# Patient Record
Sex: MALE | Race: WHITE | NOT HISPANIC OR LATINO | Employment: FULL TIME | URBAN - METROPOLITAN AREA
[De-identification: names, ages, dates, MRNs, and addresses within clinical notes are randomized per-mention and may not be internally consistent; named-entity substitution may affect disease eponyms.]

---

## 2022-06-09 ENCOUNTER — TELEPHONE (OUTPATIENT)
Dept: PSYCHIATRY | Facility: CLINIC | Age: 32
End: 2022-06-09

## 2022-06-09 NOTE — TELEPHONE ENCOUNTER
Referred by:      Please advised interviewee that they need to answer all questions truthfully to allow for best care and any misrepresentations of information may affect their ability to be seen at this clinic   => Was this discussed? Behavorial Health Outpatient Intake History -      Presenting Problem (in patient's words):     Patient has a history of Anxiety, Autism, and possible ADHD        Are there any developmental disabilities? ? If yes, can they speak to you on the phone? If they are too limited to speak to you on phone, refer out      No    Are you taking any psychiatric medications?    => If yes, who prescribes? If yes, are they injectable medications? No    Does the patient have a language barrier or hearing impairment? No     Have you been treated at [facility] [place] by a therapist or a doctor in the past? If yes, who? No    Has the patient been hospitalized for mental health? If yes, how long ago was last hospitalization and where was it? No     Do you actively use alcohol or marijuana or illegal substances? If yes, what and how much - refer out to Drug and alcohol treatment if use is excessive or daily use of illegal substances     Alcohol- a few time a week mostly social          Do you have a community treatment team or ? No     Legal History-      Does the patient have any history of arrests, senior living/group home time, or DUIs? If Yes-  1  What types of charges? 2  When were they last incarcerated? 3  Are they currently on parole or probation? Minor Child-  N/a     Who has custody of the child? Is there a custody agreement? If there is a custody agreement remind parent that they must bring a copy to the first appt or they will not be seen        Intake Team, please check with provider before scheduling if flags come up such as:  - complex case  - legal history (other than DUI)  - communication barrier concerns are present  - if, in your judgment, this needs further review     ACCEPTED as a patient      yes      =>        Appointment Date: 7/8/22 2pm with Dr Muñoz     Referred Elsewhere? Name of Insurance Co: [employer] ID# [id number]  [id number] Phone #  If ins is primary or secondary: Primary  If patient is a minor, parents information such as Name, D  O B of guarantor

## 2022-07-08 ENCOUNTER — TELEPHONE (OUTPATIENT)
Dept: PSYCHIATRY | Facility: CLINIC | Age: 32
End: 2022-07-08

## 2022-07-08 ENCOUNTER — OFFICE VISIT (OUTPATIENT)
Dept: PSYCHIATRY | Facility: CLINIC | Age: 32
End: 2022-07-08
Payer: COMMERCIAL

## 2022-07-08 VITALS
SYSTOLIC BLOOD PRESSURE: 111 MMHG | HEART RATE: 68 BPM | HEIGHT: 72 IN | WEIGHT: 166.3 LBS | DIASTOLIC BLOOD PRESSURE: 60 MMHG | BODY MASS INDEX: 22.52 KG/M2

## 2022-07-08 DIAGNOSIS — F90.2 ADHD (ATTENTION DEFICIT HYPERACTIVITY DISORDER), COMBINED TYPE: Primary | ICD-10-CM

## 2022-07-08 DIAGNOSIS — F41.1 GENERALIZED ANXIETY DISORDER: ICD-10-CM

## 2022-07-08 PROCEDURE — 90792 PSYCH DIAG EVAL W/MED SRVCS: CPT | Performed by: STUDENT IN AN ORGANIZED HEALTH CARE EDUCATION/TRAINING PROGRAM

## 2022-07-08 RX ORDER — ATOMOXETINE 40 MG/1
40 CAPSULE ORAL DAILY
Qty: 30 CAPSULE | Refills: 1 | Status: SHIPPED | OUTPATIENT
Start: 2022-07-08 | End: 2022-07-19 | Stop reason: SDUPTHER

## 2022-07-08 NOTE — PSYCH
[address]    Name and Date of Birth:  Nahomy Guzman 36 y o  1990 MRN: [id number]    Date of Visit: July 8, 2022    Reason for visit: Initial psychiatric intake assessment    Chief complaint:  I feel again been having trouble with attention focus  History of Present Illness (HPI):      Nahomy Guzman is a 36 y o , male, possessing a previous psychiatric history significant for ADHD, Asperger's, anxiety, presenting to the [address] [place] [address] outpatient clinic for intake assessment  Beverly Amaya states that he has carried past diagnosis of autism spectrum disorder, ADHD since a child  He is followed with a therapist in the more recent times, no major medications except for some antidepressants prescribed by his PCP  He states that over the past few months he has been experiencing worsening issues with attention and focus  He states that he is very distractible, has difficulty with most multi tasking, he is forgetful, distracted, impulsive, difficulty with tasks that require sustained mental effort, disorganization, often blurting things out, feeling restless, hypertalkative  He will sometimes blurt things out and finds he has difficulty interacting socially  This plays into some of his so describe symptoms of Asperger's, reporting that he has problems connecting with people and missing social cues  He does not have any major restricted interests or patterns, although does enjoy playing video games  Reports that he does have some increased anxiety symptoms include excessive worry and rumination, often related about money and being on places at a specific time  He does feel stressed, tense, irritable throughout the day  He has experienced intermittent panic attacks although none in the recent time  Reports that he does tend to over think situations  He describes that sleep has been fairly well controlled    Some minor decrease in interest levels, no guilt, no change in energy, decrease in concentration as mentioned above, no significant weight loss  He does report feeling depressed - "at times" - but does not feel this is a particular problem now  Denies any SI, HI, AVH  Denies any paranoia or obsessions  No bingeing or purging, no eating disorder  No yamilex or hypomania  Presently, patient denies suicidal/homicidal ideation in addition to thoughts of self-injury; contracts for safety, see below for risk assessment  At conclusion of evaluation, patient is amenable to the recommendations of this writer including: therapy, medications  Also, patient is amenable to calling/contacting the outpatient office including this writer if any acute adverse effects of their medication regimen arise in addition to any comments or concerns pertaining to their psychiatric management  Patient is amenable to calling/contacting crisis and/or attending to the nearest emergency department if their clinical condition deteriorates to assure their safety and stability, stating that they are able to appropriately confide in their family regarding their psychiatric state  Current Rating Scores:     None completed today      Psychiatric Review Of Systems:    Appetite: no  Adverse eating: no  Weight changes: no  Insomnia/sleeplessness: no  Fatigue/anergy: no  Anhedonia/lack of interest: decreased  Attention/concentration: decreased  Psychomotor agitation/retardation: no  Somatic symptoms: no  Anxiety/panic attack: yes  Yamilex/hypomania: no  Hopelessness/helplessness/worthlessness: no  Self-injurious behavior/high-risk behavior: no  Suicidal ideation: no  Homicidal ideation: no  Auditory hallucinations: no  Visual hallucinations: no  Other perceptual disturbances: no  Delusional thinking: no  Obsessive/compulsive symptoms: no    Review Of Systems:    Constitutional negative   ENT negative   Cardiovascular negative   Respiratory negative Gastrointestinal negative   Genitourinary negative   Musculoskeletal negative   Integumentary negative   Neurological negative   Endocrine negative   Other Symptoms none, all other systems are negative       Family Psychiatric History:     Father and mother with depression  Father with alcohol use although unsure if alcohol abuse  Past Psychiatric History:     Previous inpatient psychiatric admissions:  None  Previous inpatient/outpatient substance abuse rehabilitation:  None  Present/previous outpatient psychiatric treatment:  Had seen a psychiatrist during his childhood  Past diagnosis of ADHD, Asperger  Present/previous psychotherapy:  Saw therapist approximately 1 year ago  History of suicidal attempts/gestures:  None  History of violence/aggressive behaviors:  None  Present/previous psychotropic medication use:  Concerta, Lexapro, Celexa  Substance Abuse History:    Patient denies history of alcohol, illict substance, or tobacco abuse  Reports that he will use alcohol socially  Jordi Chan does not apear under the influence or withdrawal of any psychoactive substance throughout today's examination  Social History:    Academic history: college graduate; film studies from [employer]  Marital history: single  Social support system: lives alone  Residential history: Resides in [facility]; lives alone - previously in relationship with girlfriend but broke up few weeks back  Vocational History: works in [employer] field - [employer]  Legal History: none    Traumatic History:     Abuse:none is reported  Other Traumatic Events: n/a    Past Medical History:    No past medical history on file  No past surgical history on file  Not on File    History Review:     The following portions of the patient's history were reviewed and updated as appropriate: allergies, current medications, past family history, past medical history, past social history, past surgical history and problem list     OBJECTIVE:    Vital signs in last 24 hours:    Vitals:    07/08/22 1356   BP: 111/60   Pulse: 68   Weight: 75 4 kg (166 lb 4 8 oz)   Height: 6' (1 829 m)       Mental Status Evaluation:    Appearance age appropriate, casually dressed   Behavior cooperative, calm, limited eye contact   Speech normal rate, normal volume, normal pitch   Mood anxious   Affect constricted   Thought Processes organized, goal directed   Associations intact associations   Thought Content no overt delusions   Perceptual Disturbances: no auditory hallucinations, no visual hallucinations   Abnormal Thoughts  Risk Potential Suicidal ideation - None  Homicidal ideation - None  Potential for aggression - No   Orientation oriented to person, place, time/date and situation   Memory recent and remote memory grossly intact   Consciousness alert and awake   Attention Span Concentration Span attention span and concentration are age appropriate   Intellect appears to be of average intelligence   Insight intact   Judgement intact   Muscle Strength and  Gait normal muscle strength and normal muscle tone, normal gait and normal balance   Motor Activity no abnormal movements   Language no difficulty naming common objects, no difficulty repeating a phrase, no difficulty writing a sentence   Fund of Knowledge adequate knowledge of current events  adequate fund of knowledge regarding past history  adequate fund of knowledge regarding vocabulary    Pain none   Pain Scale 0       Laboratory Results: I have personally reviewed all pertinent laboratory/tests results    Recent Labs (last 2 months):   No visits with results within 2 Month(s) from this visit  Latest known visit with results is:   No results found for any previous visit         Suicide/Homicide Risk Assessment:    Risk of Harm to Self:  The following ratings are based on assessment at the time of the interview  Demographic risk factors include:   Historical Risk Factors include: chronic psychiatric problems  Recent Specific Risk Factors include: diagnosis of depression, current anxiety symptoms  Protective Factors: no current suicidal ideation, ability to adapt to change, able to manage anger well, access to mental health treatment, compliant with mental health treatment, connection to community, contact with caregivers, effective coping skills, effective decision-making skills, effective problem solving skills  Based on today's assessment, Padriac presents the following risk of harm to self: minimal    Risk of Harm to Others: The following ratings are based on assessment at the time of the interview  Demographic Risk Factors include: male  Historical Risk Factors include: none  Recent Specific Risk Factors include: multiple stressors  Protective Factors: no current homicidal ideation, ability to adapt to change, able to manage anger well, access to mental health treatment, compliant with mental health treatment, connection to community, effective coping skills, effective decision-making skills, effective problem solving skills  Based on today's assessment, Padriac presents the following risk of harm to others: minimal    The following interventions are recommended: no intervention changes needed  Although patient's acute lethality risk is low, long-term/chronic lethality risk is mildly elevated in the presence of PAVAN, ADHD  At the current moment, Yumiko Palmer is future-oriented, forward-thinking, and demonstrates ability to act in a self-preserving manner as evidenced by volitionally presenting to the clinic today, seeking treatment  Additionally, Yumiko Palmer sits throughout the assessment wearing personal protective gear (i e  medical mask) in the context of the ongoing COVID-19 pandemic, suggesting a will and desire to live  At this juncture, inpatient hospitalization is not currently warranted   To mitigate future risk, patient should adhere to the recommendations of this writer, avoid alcohol/illicit substance use, utilize community-based resources and familiar support and prioritize mental health treatment  Assessment/Plan:     Courtney Etienne is a 44-year-old male who carries a past psychiatric history significant for autism spectrum disorder ADHD that presents for establishment of care today  Reports worsening anxiety symptoms, excessive worry and rumination, mostly about money and being at places at a specific time  Feeling tense, irritable, stressed  Rare panic attacks  Also symptoms of ADHD with decreased concentration and feeling more distracted and forgetful  There is some hyperactive components as well as inattentive components  Does describe history of autism spectrum disorder, previously diagnosed with Asperger's  States that he has mostly difficulty with forming social connections although does not seem to have many other components of ASD which make it likely that if he is on the spectrum he is very high functioning  Has good insight, supports, good strengths  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[employer], 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  DSM-5 Diagnoses:      Generalized anxiety disorder, attention deficit hyperactivity disorder      Treatment Recommendations/Precautions:     Start Strattera 40 mg daily for attention, focus as well as depressive and anxiety symptoms   Medication management every 8 weeks   Referral for individual psychotherapy   Aware of 24 hour and weekend coverage for urgent situations accessed by calling [facility] main practice number    Medications Risks/Benefits:      Risks, Benefits And Possible Side Effects Of Medications:    Risks, benefits, and possible side effects of medications explained to GAVIOTA Barbour including risk of suicidality and serotonin syndrome related to treatment with antidepressants  He verbalizes understanding and agreement for treatment      Controlled Medication Discussion:     Not applicable    Treatment Plan:    Completed and signed during the session: Yes - with Ismael Conner    This note was not shared with the patient due to reasonable likelihood of causing patient harm    Tae Santos MD 07/08/22

## 2022-07-08 NOTE — BH TREATMENT PLAN
TREATMENT PLAN (Medication Management Only)        [facility]    Name and Date of Birth:  Madison Zafar 36 y o  1990  Date of Treatment Plan: July 8, 2022  Diagnosis/Diagnoses:    1  ADHD (attention deficit hyperactivity disorder), combined type    2  Generalized anxiety disorder      Strengths/Personal Resources for Self-Care: supportive family, supportive friends, taking medications as prescribed, ability to adapt to life changes, ability to communicate needs, ability to communicate well, ability to listen, ability to reason, ability to understand psychiatric illness  Area/Areas of need (in own words): anxiety, ADHD symptoms  1  Long Term Goal: continue improvement in ADHD symptoms  Target Date:6 months - 1/8/2023  Person/Persons responsible for completion of goal: Padriac  2  Short Term Objective (s) - How will we reach this goal?:   A  Provider new recommended medication/dosage changes and/or continue medication(s): continue current medications as prescribed Strattera  B  Attend medication management appointments regularly  Brayan Cervantes all scheduled appointments  Target Date:6 months - 1/8/2023  Person/Persons Responsible for Completion of Goal: Padriac  Progress Towards Goals: progressing  Treatment Modality: medication management every 2 months  Review due 180 days from date of this plan: 6 months - 1/8/2023  Expected length of service: maintenance  My Physician/PA/NP and I have developed this plan together and I agree to work on the goals and objectives  I understand the treatment goals that were developed for my treatment

## 2022-07-18 ENCOUNTER — TELEPHONE (OUTPATIENT)
Dept: PSYCHIATRY | Facility: CLINIC | Age: 32
End: 2022-07-18

## 2022-07-18 NOTE — TELEPHONE ENCOUNTER
Called Alber and was informed that Atomoxetine is covered under [employer] but there is a $91 13 co-pay  LM on Jennifer's VM that Dr Renny Boyd is not in today and I will refer to him for review tomorrow

## 2022-07-18 NOTE — TELEPHONE ENCOUNTER
Jennifer left a message at [facility] over the weekend  Patient states that his Catalina Constantino is too expensive  Insurance is covering it but it is still going to cost the patient $100 to fill it

## 2022-07-19 DIAGNOSIS — F90.2 ADHD (ATTENTION DEFICIT HYPERACTIVITY DISORDER), COMBINED TYPE: ICD-10-CM

## 2022-07-19 RX ORDER — ATOMOXETINE 40 MG/1
40 CAPSULE ORAL DAILY
Qty: 30 CAPSULE | Refills: 1 | Status: SHIPPED | OUTPATIENT
Start: 2022-07-19 | End: 2022-10-21

## 2022-08-26 ENCOUNTER — OFFICE VISIT (OUTPATIENT)
Dept: PSYCHIATRY | Facility: CLINIC | Age: 32
End: 2022-08-26
Payer: COMMERCIAL

## 2022-08-26 DIAGNOSIS — F90.2 ADHD (ATTENTION DEFICIT HYPERACTIVITY DISORDER), COMBINED TYPE: Primary | ICD-10-CM

## 2022-08-26 PROCEDURE — 99214 OFFICE O/P EST MOD 30 MIN: CPT | Performed by: STUDENT IN AN ORGANIZED HEALTH CARE EDUCATION/TRAINING PROGRAM

## 2022-08-26 PROCEDURE — 90833 PSYTX W PT W E/M 30 MIN: CPT | Performed by: STUDENT IN AN ORGANIZED HEALTH CARE EDUCATION/TRAINING PROGRAM

## 2022-08-26 RX ORDER — DEXTROAMPHETAMINE SACCHARATE, AMPHETAMINE ASPARTATE, DEXTROAMPHETAMINE SULFATE AND AMPHETAMINE SULFATE 1.25; 1.25; 1.25; 1.25 MG/1; MG/1; MG/1; MG/1
5 TABLET ORAL 2 TIMES DAILY
Qty: 60 TABLET | Refills: 0 | Status: SHIPPED | OUTPATIENT
Start: 2022-08-26 | End: 2022-10-21

## 2022-08-26 NOTE — PSYCH
MEDICATION MANAGEMENT NOTE        [address]      Name and Date of Birth:  Giovanni Gentile 36 y o  1990 MRN: [id number]    Date of Visit: August 26, 2022    Reason for Visit: Follow-up visit regarding medication management     Chief Complaint: "The last medicine did not work"    SUBJECTIVE:    Giovanni Gentile is a 36 y o , male, possessing a past psychiatric history significant for ADHD, Asperger's, anxiety, who was personally seen and evaluated at the [address] [place] [address] E outpatient clinic for follow-up regarding medication management  Melissamassiel Cannon states that since their previous outpatient psychiatric appointment, he continues to experience difficulty with attention and focus  He tried the Strattera for approximately 1 week, began to experience side effects and discontinued it  Stated that he felt zoned out while taking it and overall felt as though it worsened his anxiety so he decided to stop it  Reports that he continues to have trouble with focus and attention, easily is distracted, finds that he is forgetful with tasks and has to set reminders  He finds that he can be hyper focused on some things for period of time though this is brief  He finds that due to the poor focus his anxiety levels are increased  Finds that he worries about things, often completing duties and forgetting meetings at work  Reports that otherwise sleep is stable, appetite is good  Energy levels are adequate  No SI, HI, AVH  Current Rating Scores:   None completed today      Psychiatric Review Of Systems:    Appetite: no  Adverse eating: no  Weight changes: no  Insomnia/sleeplessness: no  Fatigue/anergy: no  Anhedonia/lack of interest: no  Attention/concentration: decreased  Psychomotor agitation/retardation: no  Somatic symptoms: no  Anxiety/panic attack: yes, worrying  Yamilex/hypomania: no  Hopelessness/helplessness/worthlessness: no  Self-injurious behavior/high-risk behavior: no  Suicidal ideation: no  Homicidal ideation: no  Auditory hallucinations: no  Visual hallucinations: no  Other perceptual disturbances: no  Delusional thinking: no  Obsessive/compulsive symptoms: no    Review Of Systems:      Constitutional negative   ENT negative   Cardiovascular negative   Respiratory negative   Gastrointestinal negative   Genitourinary negative   Musculoskeletal negative   Integumentary negative   Neurological negative   Endocrine negative   Other Symptoms none, all other systems are negative     History Review:    The following portions of the patient's history were reviewed and updated as appropriate: allergies, current medications, past family history, past medical history, past social history, past surgical history and problem list          OBJECTIVE:     Vital signs in last 24 hours:    Vitals:       Mental Status Evaluation:    Appearance age appropriate, casually dressed   Behavior cooperative, mildly anxious, limited eye contact   Speech normal rate, normal volume, normal pitch   Mood anxious   Affect constricted   Thought Processes organized, goal directed   Associations intact associations   Thought Content no overt delusions   Perceptual Disturbances: no auditory hallucinations, no visual hallucinations   Abnormal Thoughts  Risk Potential Suicidal ideation - None  Homicidal ideation - None  Potential for aggression - No   Orientation oriented to person, place, time/date and situation   Memory recent and remote memory grossly intact   Consciousness alert and awake   Attention Span Concentration Span decreased attention span  decreased concentration   Intellect appears to be of average intelligence   Insight intact   Judgement intact   Muscle Strength and  Gait normal muscle strength and normal muscle tone, normal gait and normal balance   Motor activity no abnormal movements   Fund of Knowledge adequate knowledge of current events  adequate fund of knowledge regarding past history  adequate fund of knowledge regarding vocabulary    Pain none   Pain Scale 0       Laboratory Results: I have personally reviewed all pertinent laboratory/tests results    Recent Labs (last 2 months):   No visits with results within 2 Month(s) from this visit  Latest known visit with results is:   No results found for any previous visit  Suicide/Homicide Risk Assessment:    The following interventions are recommended: no intervention changes needed  Although patient's acute lethality risk is low, long-term/chronic lethality risk is mildly elevated in the presence of ADHD, PAVAN  At the current moment, Jorgito Pittman is future-oriented, forward-thinking, and demonstrates ability to act in a self-preserving manner as evidenced by volitionally presenting to the clinic today, seeking treatment  To mitigate future risk, patient should adhere to the recommendations below, avoid alcohol/illicit substance use, utilize community-based resources and familiar support and prioritize mental health treatment  Presently, patient denies active suicidal/homicidal ideation in addition to thoughts of self-injury; contracts for safety  At conclusion of evaluation, patient is amenable to the recommendations below  Patient is amenable to calling/contacting the outpatient office including this writer if any acute adverse effects of their medication regimen arise in addition to any comments or concerns pertaining to their psychiatric management  Patient is amenable to calling/contacting crisis and/or attending to the nearest emergency department if their clinical condition deteriorates to assure their safety and stability, stating that they are able to appropriately confide in their family regarding their psychiatric state      Assessment/Plan:     Ericka Rojas is a 24-year-old male who carries a past psychiatric history significant for autism spectrum disorder ADHD that presents for establishment of care today  Reports worsening anxiety symptoms, excessive worry and rumination, mostly about money and being at places at a specific time  Feeling tense, irritable, stressed  Rare panic attacks  Also symptoms of ADHD with decreased concentration and feeling more distracted and forgetful  There is some hyperactive components as well as inattentive components  Does describe history of autism spectrum disorder, previously diagnosed with Asperger's  States that he has mostly difficulty with forming social connections although does not seem to have many other components of ASD which make it likely that if he is on the spectrum he is very high functioning  Has good insight, supports, good strengths  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[employer], 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  8/26/22 - Did not tolerate Strattera  Still struggling with attention and focus  Anxiety with work, though mostly related to the poor focus and attention at work  I feel the inadequate control of ADHD is contributing to his anxiety  Will trial Adderall 5mg BID and titrate to improve focus  Will monitor anxiety to see if this is aggravated  Medication trials: Strattera - "zoned out"    DSM-5 Diagnoses:      Generalized anxiety disorder, attention deficit hyperactivity disorder inattentive type      Treatment Recommendations/Precautions:     Start Adderall 5 mg b i d  for attention and focus     Medication management every 8 weeks   Referral for individual psychotherapy   Aware of 24 hour and weekend coverage for urgent situations accessed by calling [facility] main practice number    Medications Risks/Benefits      Risks, Benefits And Possible Side Effects Of Medications:    Risks, benefits, and possible side effects of medications explained to GAVIOTA Barbour including risks of cardiovascular side effects including elevated blood pressure, risk of misuse, abuse or dependence and risk of increased anxiety related to treatment with stimulant medications  He verbalizes understanding and agreement for treatment  Controlled Medication Discussion:     Palomo Salguero has been filling controlled prescriptions on time as prescribed according to [facility]  Discussed with Jennifer the risks of sedation, respiratory depression, impairment of ability to drive and potential for abuse and addiction related to treatment with benzodiazepine medications  He understands risk of treatment with benzodiazepine medications, agrees to not drive if feels impaired and agrees to take medications as prescribed  Discussed with Jennifer [employer] [place] warning on concurrent use of benzodiazepines and opioid medications including sedation, respiratory depression, coma and death  He understands the risk of treatment with benzodiazepines in addition to opioids and wants to continue taking those medications    Psychotherapy Provided:     Individual psychotherapy provided: Yes  Counseling was provided during the session today for 16 minutes  Medications, treatment progress and treatment plan reviewed with Jennifer  Medication changes discussed with Jennifer  Recent stressor including problems at work, recent medication change, social difficulties and everyday stressors discussed with Jennifer  Supportive therapy provided        Treatment Plan:    Completed and signed during the session: Not applicable - Treatment Plan not due at this session    This note was not shared with the patient due to reasonable likelihood of causing patient harm     Fabiana Grover MD 08/26/22

## 2022-10-18 ENCOUNTER — TELEPHONE (OUTPATIENT)
Dept: PSYCHIATRY | Facility: CLINIC | Age: 32
End: 2022-10-18

## 2022-10-21 ENCOUNTER — OFFICE VISIT (OUTPATIENT)
Dept: PSYCHIATRY | Facility: CLINIC | Age: 32
End: 2022-10-21
Payer: COMMERCIAL

## 2022-10-21 DIAGNOSIS — F33.1 MAJOR DEPRESSIVE DISORDER, RECURRENT, MODERATE (HCC): ICD-10-CM

## 2022-10-21 DIAGNOSIS — F90.2 ADHD (ATTENTION DEFICIT HYPERACTIVITY DISORDER), COMBINED TYPE: Primary | ICD-10-CM

## 2022-10-21 DIAGNOSIS — F41.1 GENERALIZED ANXIETY DISORDER: ICD-10-CM

## 2022-10-21 PROCEDURE — 99214 OFFICE O/P EST MOD 30 MIN: CPT | Performed by: STUDENT IN AN ORGANIZED HEALTH CARE EDUCATION/TRAINING PROGRAM

## 2022-10-21 PROCEDURE — 90833 PSYTX W PT W E/M 30 MIN: CPT | Performed by: STUDENT IN AN ORGANIZED HEALTH CARE EDUCATION/TRAINING PROGRAM

## 2022-10-21 RX ORDER — METHYLPHENIDATE HYDROCHLORIDE 10 MG/1
10 CAPSULE, EXTENDED RELEASE ORAL EVERY MORNING
Qty: 30 CAPSULE | Refills: 0 | Status: SHIPPED | OUTPATIENT
Start: 2022-10-21 | End: 2022-11-20

## 2022-10-21 RX ORDER — ESCITALOPRAM OXALATE 10 MG/1
10 TABLET ORAL DAILY
Qty: 30 TABLET | Refills: 1 | Status: SHIPPED | OUTPATIENT
Start: 2022-10-21 | End: 2022-11-20

## 2022-10-21 NOTE — PSYCH
MEDICATION MANAGEMENT NOTE        [facility]      Name and Date of Birth:  Sb Bruner 36 y o  1990 MRN: [id number]    Date of Visit: October 21, 2022    Reason for Visit: Follow-up visit regarding medication management     Chief Complaint: "My focus has been bad"    SUBJECTIVE:    Sb Bruner is a 36 y o , male, possessing a past psychiatric history significant for ADHD, Asperger's, anxiety, who was personally seen and evaluated at the [address] [place] [address] E outpatient clinic for follow-up regarding medication management  Annel Joya states that since their previous outpatient psychiatric appointment, his ADHD symptoms have worsened  He reports that he did not tolerate the Adderall well  He felt that it made him feel more “spacey”  He also reports that increased his anxiety which resulted in worsening attention and focus  He notes that inattention seems to be worse over the past few weeks as well as impulsivity  He reports that he has been more distracted and forgetful  Gives the example of forgetting to get his bad/uniform for work and having to return back home  He finds that he is more distractible and easily frustrated  He also notes being more impulsive and endorsing hyperactive symptoms  States he has been blurting things out, butting into conversations, which has resulted in some frustrations with friends in public  He also reports some worsening symptoms of depression  Notes that he is not enjoying things as much as he previously did, feeling low energy, decreased motivation levels  He has some guilt associated with this  Anxiety remains intermittent,  with ruminations about stressors throughout the day  He does endorse some passive and fleeting suicidal thoughts, never any intention that he would harm himself though wishing that “all this would go way”  Denies any HI, AVH      Current Rating Scores:   None completed today     Psychiatric Review Of Systems:    Appetite: no  Adverse eating: no  Weight changes: no  Insomnia/sleeplessness: decreased  Fatigue/anergy: decreased  Anhedonia/lack of interest: decreased  Attention/concentration: decreased  Psychomotor agitation/retardation: no  Somatic symptoms: no  Anxiety/panic attack: yes  Yamilex/hypomania: no  Hopelessness/helplessness/worthlessness: yes  Self-injurious behavior/high-risk behavior: no  Suicidal ideation: yes, passive death wish  Homicidal ideation: no  Auditory hallucinations: no  Visual hallucinations: no  Other perceptual disturbances: no  Delusional thinking: no  Obsessive/compulsive symptoms: no    Review Of Systems:      Constitutional feeling tired   ENT negative   Cardiovascular negative   Respiratory negative   Gastrointestinal negative   Genitourinary negative   Musculoskeletal negative   Integumentary negative   Neurological negative   Endocrine negative   Other Symptoms none, all other systems are negative     History Review: The following portions of the patient's history were reviewed and updated as appropriate: allergies, current medications, past family history, past medical history, past social history, past surgical history, and problem list          OBJECTIVE:     Vital signs in last 24 hours: There were no vitals filed for this visit      Mental Status Evaluation:    Appearance age appropriate, casually dressed   Behavior cooperative, calm, decreased eye contact   Speech normal rate, normal volume, normal pitch, hypertalkative   Mood depressed   Affect constricted   Thought Processes organized, goal directed   Associations intact associations   Thought Content no overt delusions   Perceptual Disturbances: no auditory hallucinations, no visual hallucinations   Abnormal Thoughts  Risk Potential Suicidal ideation - None  Homicidal ideation - None  Potential for aggression - No   Orientation oriented to person, place, time/date and situation Memory recent and remote memory grossly intact   Consciousness alert and awake   Attention Span Concentration Span attention span and concentration are age appropriate   Intellect appears to be of average intelligence   Insight intact   Judgement intact   Muscle Strength and  Gait normal muscle strength and normal muscle tone, normal gait and normal balance   Motor activity no abnormal movements   Fund of Knowledge adequate knowledge of current events  adequate fund of knowledge regarding past history  adequate fund of knowledge regarding vocabulary    Pain none   Pain Scale 0       Laboratory Results: I have personally reviewed all pertinent laboratory/tests results    Recent Labs (last 2 months):   No visits with results within 2 Month(s) from this visit  Latest known visit with results is:   No results found for any previous visit  Suicide/Homicide Risk Assessment:    The following interventions are recommended: no intervention changes needed  Although patient's acute lethality risk is low, long-term/chronic lethality risk is mildly elevated in the presence of MDD, ADHD, PAVAN  At the current moment, Jorgito Pittman is future-oriented, forward-thinking, and demonstrates ability to act in a self-preserving manner as evidenced by volitionally presenting to the clinic today, seeking treatment  To mitigate future risk, patient should adhere to the recommendations below, avoid alcohol/illicit substance use, utilize community-based resources and familiar support and prioritize mental health treatment  Presently, patient denies active suicidal/homicidal ideation in addition to thoughts of self-injury; contracts for safety  At conclusion of evaluation, patient is amenable to the recommendations below   Patient is amenable to calling/contacting the outpatient office including this writer if any acute adverse effects of their medication regimen arise in addition to any comments or concerns pertaining to their psychiatric management  Patient is amenable to calling/contacting crisis and/or attending to the nearest emergency department if their clinical condition deteriorates to assure their safety and stability, stating that they are able to appropriately confide in their family/friends regarding their psychiatric state  Assessment/Plan:     Freddie De Leon is a 63-year-old male who carries a past psychiatric history significant for autism spectrum disorder ADHD that presents for establishment of care today  Reports worsening anxiety symptoms, excessive worry and rumination, mostly about money and being at places at a specific time  Feeling tense, irritable, stressed  Rare panic attacks  Also symptoms of ADHD with decreased concentration and feeling more distracted and forgetful  There is some hyperactive components as well as inattentive components  Does describe history of autism spectrum disorder, previously diagnosed with Asperger's  States that he has mostly difficulty with forming social connections although does not seem to have many other components of ASD which make it likely that if he is on the spectrum he is very high functioning  Has good insight, supports, good strengths  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[employer], 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  8/26/22 - Did not tolerate Strattera  Still struggling with attention and focus  Anxiety with work, though mostly related to the poor focus and attention at work  I feel the inadequate control of ADHD is contributing to his anxiety  Will trial Adderall 5mg BID and titrate to improve focus  Will monitor anxiety to see if this is aggravated  10/26/22 - Increased anxiety with Adderall, he discontinued it  Struggling with inattentive as well as hyperactive symptoms  Increased depressive symptoms occurring as well  There is likely a combination of anxiety and uncontrolled ADHD will that is occurring or concurrently    He is having some work impairment with his inattention and poor focus  He would likely benefit from a long-acting stimulant as opposed to immediate release to medicate the anxiety side effects  We will also start Lexapro to target depression and anxiety symptoms, he tolerated this well in the past  He has trouble remembering to take medications - I reminded him of the [id number] adrien to assist with this  Medication trials: Strattera - "zoned out"; Adderall IR - increased anxiety    DSM-5 Diagnoses:     • Generalized anxiety disorder, attention deficit hyperactivity disorder inattentive type      Treatment Recommendations/Precautions:    • Start Ritalin LA 10mg daily for attention and focus  • Start Lexapro 10mg daily for depression  • Medication management every 4 weeks  • Referral for individual psychotherapy  • Aware of 24 hour and weekend coverage for urgent situations accessed by calling [facility] main practice number    Medications Risks/Benefits      Risks, Benefits And Possible Side Effects Of Medications:    Risks, benefits, and possible side effects of medications explained to GAVIOTA Barbour including risk of suicidality and serotonin syndrome related to treatment with antidepressants and risks of cardiovascular side effects including elevated blood pressure, risk of misuse, abuse or dependence and risk of increased anxiety related to treatment with stimulant medications  He verbalizes understanding and agreement for treatment  Controlled Medication Discussion:     GAVIOTA Mehran Angle has been filling controlled prescriptions on time as prescribed according to [address] Monitoring Program    Psychotherapy Provided:     Individual psychotherapy provided: Yes  Counseling was provided during the session today for 16 minutes  Medication changes discussed with Jennifer  Medication education provided to GAVIOTA Barbour    Recent stressors discussed with Jennifer including job stress, problems at work, social difficulties, everyday stressors and occasional anxiety  Reassurance and supportive therapy provided        Treatment Plan:    Completed and signed during the session: Not applicable - Treatment Plan not due at this session    This note was not shared with the patient due to reasonable likelihood of causing patient harm    Visit start and stop times:    Start Time: 3:00 PM  Stop Time: 3:32 PM    I spent 32 minutes directly with the patient during this visit        Karla Kemp MD 10/21/22

## 2022-11-18 ENCOUNTER — OFFICE VISIT (OUTPATIENT)
Dept: PSYCHIATRY | Facility: CLINIC | Age: 32
End: 2022-11-18

## 2022-11-18 DIAGNOSIS — F41.1 GENERALIZED ANXIETY DISORDER: ICD-10-CM

## 2022-11-18 DIAGNOSIS — F90.2 ADHD (ATTENTION DEFICIT HYPERACTIVITY DISORDER), COMBINED TYPE: ICD-10-CM

## 2022-11-18 DIAGNOSIS — F33.1 MAJOR DEPRESSIVE DISORDER, RECURRENT, MODERATE (HCC): Primary | ICD-10-CM

## 2022-11-18 RX ORDER — METHYLPHENIDATE HYDROCHLORIDE 18 MG/1
18 TABLET ORAL DAILY
Qty: 30 TABLET | Refills: 0 | Status: SHIPPED | OUTPATIENT
Start: 2022-11-18 | End: 2022-11-18

## 2022-11-18 RX ORDER — METHYLPHENIDATE HYDROCHLORIDE 10 MG/1
10 CAPSULE, EXTENDED RELEASE ORAL EVERY MORNING
Qty: 30 CAPSULE | Refills: 0 | Status: SHIPPED | OUTPATIENT
Start: 2022-11-18 | End: 2022-11-18

## 2022-11-18 RX ORDER — METHYLPHENIDATE HYDROCHLORIDE 5 MG/1
5 TABLET ORAL
Qty: 60 TABLET | Refills: 0 | Status: SHIPPED | OUTPATIENT
Start: 2022-11-18 | End: 2022-12-18

## 2022-11-18 RX ORDER — ESCITALOPRAM OXALATE 10 MG/1
10 TABLET ORAL DAILY
Qty: 30 TABLET | Refills: 2 | Status: SHIPPED | OUTPATIENT
Start: 2022-11-18 | End: 2022-12-18

## 2022-11-18 NOTE — PSYCH
MEDICATION MANAGEMENT NOTE        [address] [facility]      Name and Date of Birth:  Mouna Muniz 36 y o  1990 MRN: [id number]    Date of Visit: November 18, 2022    Reason for Visit: Follow-up visit regarding medication management     Chief Complaint: "The depression is a little better"    SUBJECTIVE:    Mouna Muniz is a 36 y o , male, possessing a past psychiatric history significant for ADHD, Asperger's, anxiety, who presents for follow-up appointment for medication management  Arjun Arita states that since their previous outpatient psychiatric appointment, he notes some improvement in depression  He reports that he is not feeling as down, negative, pessimistic  He feels that he is slightly happier, finding that he is able to enjoy things a slightly more  He continues to experience significant anxiety and hyperactive symptoms  He feels as though he is driven by a motor, describes feeling the need to “constantly do things"  He states when he is not active or preoccupied with the test he feels tense, restless, fidgety  He often feels he will worry about things and overthink situations - he recognizes this is likely irrational but cannot stop the ruminations  He reports ongoing issues with attention and concentration  Feeling distracted  He was unable to obtain long-acting formulation of Ritalin due to expense at pharmacy  He is interested in trialing the short-acting formulation even though did result in increased anxiety and was difficult to adhere to twice daily dosing  He reports that his sleep has been fairly well, averaging 6-7 hours per night  Appetite is stable, no significant changes in his weight  He denies any major side effects to the Lexapro, though does report that it made him feel “mentally tired”  Denies any SI, HI, AVH      Current Rating Scores:   Current PHQ-9   PHQ-2/9 Depression Screening           Psychiatric Review Of Systems:    Appetite: no  Adverse eating: no  Weight changes: no  Insomnia/sleeplessness: no  Fatigue/anergy: no  Anhedonia/lack of interest: slightly improved  Attention/concentration: decreased  Psychomotor agitation/retardation: no  Somatic symptoms: no  Anxiety/panic attack: yes  Yamilex/hypomania: no  Hopelessness/helplessness/worthlessness: no  Self-injurious behavior/high-risk behavior: no  Suicidal ideation: no  Homicidal ideation: no  Auditory hallucinations: no  Visual hallucinations: no  Other perceptual disturbances: no  Delusional thinking: no  Obsessive/compulsive symptoms: no    Review Of Systems:      Constitutional negative   ENT negative   Cardiovascular negative   Respiratory negative   Gastrointestinal negative   Genitourinary negative   Musculoskeletal negative   Integumentary negative   Neurological negative   Endocrine negative   Other Symptoms none, all other systems are negative     History Review: The following portions of the patient's history were reviewed and updated as appropriate: allergies, current medications, past family history, past medical history, past social history, past surgical history, and problem list     Past Medical History:    No past medical history on file       Not on File    Substance Abuse History:    Social History     Substance and Sexual Activity   Alcohol Use Not on file     Social History     Substance and Sexual Activity   Drug Use Not on file       Social History:    Social History     Socioeconomic History   • Marital status: Single     Spouse name: Not on file   • Number of children: Not on file   • Years of education: Not on file   • Highest education level: Not on file   Occupational History   • Not on file   Tobacco Use   • Smoking status: Not on file   • Smokeless tobacco: Not on file   Substance and Sexual Activity   • Alcohol use: Not on file   • Drug use: Not on file   • Sexual activity: Not on file   Other Topics Concern   • Not on file   Social History Narrative   • Not on file     Social Determinants of Health     Financial Resource Strain: Not on file   Food Insecurity: Not on file   Transportation Needs: Not on file   Physical Activity: Not on file   Stress: Not on file   Social Connections: Not on file   Intimate Partner Violence: Not on file   Housing Stability: Not on file       Family Psychiatric History:     No family history on file  OBJECTIVE:     Vital signs in last 24 hours: There were no vitals filed for this visit  Mental Status Evaluation:    Appearance age appropriate, casually dressed   Behavior cooperative, calm, limited eye contact   Speech normal rate, normal volume, normal pitch   Mood anxious   Affect normal range and intensity, appropriate   Thought Processes organized, goal directed   Associations intact associations   Thought Content no overt delusions   Perceptual Disturbances: no auditory hallucinations, no visual hallucinations   Abnormal Thoughts  Risk Potential Suicidal ideation - None  Homicidal ideation - None  Potential for aggression - No   Orientation oriented to person, place, time/date and situation   Memory recent and remote memory grossly intact   Consciousness alert and awake   Attention Span Concentration Span attention span and concentration are age appropriate   Intellect appears to be of average intelligence   Insight intact   Judgement intact   Muscle Strength and  Gait normal muscle strength and normal muscle tone, normal gait and normal balance   Motor activity no abnormal movements   Fund of Knowledge adequate knowledge of current events  adequate fund of knowledge regarding past history  adequate fund of knowledge regarding vocabulary    Pain none   Pain Scale 0       Laboratory Results: I have personally reviewed all pertinent laboratory/tests results    Recent Labs (last 2 months):   No visits with results within 2 Month(s) from this visit     Latest known visit with results is:   No results found for any previous visit  Suicide/Homicide Risk Assessment:    The following interventions are recommended: no intervention changes needed  Although patient's acute lethality risk is low, long-term/chronic lethality risk is mildly elevated in the presence of current diagnoses  At the current moment, Arjun Arita is future-oriented, forward-thinking, and demonstrates ability to act in a self-preserving manner as evidenced by volitionally presenting to the clinic today, seeking treatment  To mitigate future risk, patient should adhere to the recommendations below, avoid alcohol/illicit substance use, utilize community-based resources and familiar support and prioritize mental health treatment  Presently, patient denies active suicidal/homicidal ideation in addition to thoughts of self-injury; contracts for safety  At conclusion of evaluation, patient is amenable to the recommendations below  Patient is amenable to calling/contacting the outpatient office including this writer if any acute adverse effects of their medication regimen arise in addition to any comments or concerns pertaining to their psychiatric management  Patient is amenable to calling/contacting crisis and/or attending to the nearest emergency department if their clinical condition deteriorates to assure their safety and stability, stating that they are able to appropriately confide in their family regarding their psychiatric state  Assessment/Plan:     Mouna Muniz is a 42-year-old male who carries a past psychiatric history significant for autism spectrum disorder ADHD that presents for establishment of care today  Reports worsening anxiety symptoms, excessive worry and rumination, mostly about money and being at places at a specific time  Feeling tense, irritable, stressed  Rare panic attacks  Also symptoms of ADHD with decreased concentration and feeling more distracted and forgetful    There is some hyperactive components as well as inattentive components  Does describe history of autism spectrum disorder, previously diagnosed with Asperger's  States that he has mostly difficulty with forming social connections although does not seem to have many other components of ASD which make it likely that if he is on the spectrum he is very high functioning  Has good insight, supports, good strengths  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[employer], 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  8/26/22 - Did not tolerate Strattera  Still struggling with attention and focus  Anxiety with work, though mostly related to the poor focus and attention at work  I feel the inadequate control of ADHD is contributing to his anxiety  Will trial Adderall 5mg BID and titrate to improve focus  Will monitor anxiety to see if this is aggravated  10/26/22 - Increased anxiety with Adderall, he discontinued it  Struggling with inattentive as well as hyperactive symptoms  Increased depressive symptoms occurring as well  There is likely a combination of anxiety and uncontrolled ADHD will that is occurring or concurrently  He is having some work impairment with his inattention and poor focus  He would likely benefit from a long-acting stimulant as opposed to immediate release to medicate the anxiety side effects  We will also start Lexapro to target depression and anxiety symptoms, he tolerated this well in the past  He has trouble remembering to take medications - I reminded him of the [email or web address] adrien to assist with this  11/18/22 Tolerated the Lexapro, some minor "mental lethargy" due to it  Feels some improvement in depressive symptoms  Still anxiety and inattentive/hyperactivity  Finds he cannot sit still and feels he must be constantly moving  Poor distraction and limited focus  He is open to trialing Ritalin IR, we discussing transitioning to Concerta (which is slightly more cost effective for him with good Rx coupon)      Medication trials: Strattera - "zoned out"; Adderall IR - increased anxiety    DSM-5 Diagnoses:     • Generalized anxiety disorder, attention deficit hyperactivity disorder inattentive type      Treatment Recommendations/Precautions:    • Start Ritalin IR 5mg BID for attention and focus  • Lexapro 10mg daily for depression  • Medication management every 8 weeks  • Referral for individual psychotherapy  • Aware of 24 hour and weekend coverage for urgent situations accessed by calling [facility] main practice number    Medications Risks/Benefits      Risks, Benefits And Possible Side Effects Of Medications:    Risks, benefits, and possible side effects of medications explained to GAVIOTA Barbour including risk of suicidality and serotonin syndrome related to treatment with antidepressants and risks of cardiovascular side effects including elevated blood pressure, risk of misuse, abuse or dependence and risk of increased anxiety related to treatment with stimulant medications  He verbalizes understanding and agreement for treatment       Controlled Medication Discussion:     GAVIOTA Barbour has been filling controlled prescriptions on time as prescribed according to [facility]  Discussed with Jennifer the risks of sedation, respiratory depression, impairment of ability to drive and potential for abuse and addiction related to treatment with benzodiazepine medications  He understands risk of treatment with benzodiazepine medications, agrees to not drive if feels impaired and agrees to take medications as prescribed  Discussed with Jennifer [employer] warning on concurrent use of benzodiazepines and opioid medications including sedation, respiratory depression, coma and death   He understands the risk of treatment with benzodiazepines in addition to opioids and wants to continue taking those medications    Psychotherapy Provided:     Individual psychotherapy provided: Yes  Counseling was provided during the session today for 16 minutes  Recent stressor including relationship problems, financial problems, job stress, recent medication change, everyday stressors and occasional anxiety discussed with Padriac  Coping techniques reviewed with Padriac  Reassurance and supportive therapy provided        Treatment Plan:    Completed and signed during the session: Not applicable - Treatment Plan not due at this session    This note was not shared with the patient due to reasonable likelihood of causing patient harm    Visit Time    Visit Start Time: 3:33PM  Visit Stop Time: 3:54 PM  Total Visit Duration: 21 minutes        Masha Lopez MD 11/18/22

## 2022-12-29 ENCOUNTER — OFFICE VISIT (OUTPATIENT)
Dept: PSYCHIATRY | Facility: CLINIC | Age: 32
End: 2022-12-29

## 2022-12-29 DIAGNOSIS — F90.2 ADHD (ATTENTION DEFICIT HYPERACTIVITY DISORDER), COMBINED TYPE: Primary | ICD-10-CM

## 2022-12-29 DIAGNOSIS — F41.1 GENERALIZED ANXIETY DISORDER: ICD-10-CM

## 2022-12-29 RX ORDER — METHYLPHENIDATE HYDROCHLORIDE 10 MG/1
10 TABLET ORAL
Qty: 60 TABLET | Refills: 0 | Status: SHIPPED | OUTPATIENT
Start: 2023-02-02 | End: 2023-03-04

## 2022-12-29 RX ORDER — METHYLPHENIDATE HYDROCHLORIDE 10 MG/1
10 TABLET ORAL
Qty: 60 TABLET | Refills: 0 | Status: SHIPPED | OUTPATIENT
Start: 2023-01-05 | End: 2023-02-04

## 2022-12-29 RX ORDER — METHYLPHENIDATE HYDROCHLORIDE 10 MG/1
10 TABLET ORAL
Qty: 60 TABLET | Refills: 0 | Status: SHIPPED | OUTPATIENT
Start: 2022-12-29 | End: 2022-12-29

## 2022-12-29 NOTE — PSYCH
MEDICATION MANAGEMENT NOTE        [address] [facility]      Name and Date of Birth:  Jose Alfredo Leiva y o  1990 MRN: [id number]    Date of Visit: December 29, 2022    Reason for Visit: Follow-up visit regarding medication management     Chief Complaint: "I have been doing pretty well"    SUBJECTIVE:    Jose Alfredo Blanco is a [place] y o , male, possessing a past psychiatric history significant forADHD, Asperger's, anxiety, who presents for follow-up appointment for medication management  Lior Guerrero states that since their previous outpatient psychiatric appointment, he has been doing fairly well  Reports that he notices the Ritalin has had some minor effects in his attention and focus  He reports that it is difficult for him to fully elucidate if he is doing better although was able to report that he has not experienced any negative side effects  He reports he does not feel any cardiac side effects or appetite suppression  Reports that he still continues to have trouble focusing and describes feeling very restless and hyperactive at times  He states he cannot sit still or focus on a video game for a sustained period of time - " I feel I have to be moving"  He reports that he has been experiencing some frustration related to work although overall does enjoy this  He is planning to travel to [place] for a new job and is looking forward to this to go snowboarding  He also has an upcoming trip over the new year holiday to visit [address] with some friends  He reports that overall he felt as though the Lexapro was not very helpful with anxiety or depression and resulted in him feeling more "tired"  He discontinued this a few weeks ago and has not noticed any significant side effects at this time  Reports that he is sleeping fairly well, sometimes having difficulty initiating sleep although overall feeling well rested  Appetite levels are stable    Adamantly denies any suicidal or homicidal ideations  No auditory or visual hallucinations are reported  Current Rating Scores:   Current PHQ-9   PHQ-2/9 Depression Screening           Psychiatric Review Of Systems:    Appetite: no  Adverse eating: no  Weight changes: no  Insomnia/sleeplessness: no  Fatigue/anergy: no  Anhedonia/lack of interest: no  Attention/concentration: decreased  Psychomotor agitation/retardation: no  Somatic symptoms: no  Anxiety/panic attack: no  Yamilex/hypomania: no  Hopelessness/helplessness/worthlessness: no  Self-injurious behavior/high-risk behavior: no  Suicidal ideation: no  Homicidal ideation: no  Auditory hallucinations: no  Visual hallucinations: no  Other perceptual disturbances: no  Delusional thinking: no  Obsessive/compulsive symptoms: no    Review Of Systems:      Constitutional negative   ENT negative   Cardiovascular negative   Respiratory negative   Gastrointestinal negative   Genitourinary negative   Musculoskeletal negative   Integumentary negative   Neurological negative   Endocrine negative   Other Symptoms none, all other systems are negative     History Review: The following portions of the patient's history were reviewed and updated as appropriate: allergies, current medications, past family history, past medical history, past social history, past surgical history, and problem list  Previous progress notes/assessments from other providers reviewed as available  Past Medical History:    No past medical history on file       Not on File    Substance Abuse History:    Social History     Substance and Sexual Activity   Alcohol Use Not on file     Social History     Substance and Sexual Activity   Drug Use Not on file       Social History:    Social History     Socioeconomic History   • Marital status: Single     Spouse name: Not on file   • Number of children: Not on file   • Years of education: Not on file   • Highest education level: Not on file   Occupational History   • Not on file Tobacco Use   • Smoking status: Not on file   • Smokeless tobacco: Not on file   Substance and Sexual Activity   • Alcohol use: Not on file   • Drug use: Not on file   • Sexual activity: Not on file   Other Topics Concern   • Not on file   Social History Narrative   • Not on file     Social Determinants of Health     Financial Resource Strain: Not on file   Food Insecurity: Not on file   Transportation Needs: Not on file   Physical Activity: Not on file   Stress: Not on file   Social Connections: Not on file   Intimate Partner Violence: Not on file   Housing Stability: Not on file       Family Psychiatric History:     No family history on file  OBJECTIVE:     Vital signs in last 24 hours: There were no vitals filed for this visit      Mental Status Evaluation:    Appearance age appropriate, casually dressed   Behavior cooperative, calm, decreased eye contact   Speech normal rate, normal volume, normal pitch   Mood euthymic   Affect normal range and intensity, appropriate   Thought Processes organized, goal directed   Associations intact associations   Thought Content no overt delusions   Perceptual Disturbances: no auditory hallucinations, no visual hallucinations   Abnormal Thoughts  Risk Potential Suicidal ideation - None  Homicidal ideation - None  Potential for aggression - No   Orientation oriented to person, place, time/date and situation   Memory recent and remote memory grossly intact   Consciousness alert and awake   Attention Span Concentration Span attention span and concentration are age appropriate   Intellect appears to be of average intelligence   Insight intact   Judgement intact   Muscle Strength and  Gait normal muscle strength and normal muscle tone, normal gait and normal balance   Motor activity no abnormal movements   Fund of Knowledge adequate knowledge of current events  adequate fund of knowledge regarding past history  adequate fund of knowledge regarding vocabulary    Pain none Pain Scale 0       Laboratory Results: I have personally reviewed all pertinent laboratory/tests results    Recent Labs (last 2 months):   No visits with results within 2 Month(s) from this visit  Latest known visit with results is:   No results found for any previous visit  Suicide/Homicide Risk Assessment:    The following interventions are recommended: no intervention changes needed  Although patient's acute lethality risk is low, long-term/chronic lethality risk is mildly elevated in the presence of current diagnoses  At the current moment, Russell Adam is future-oriented, forward-thinking, and demonstrates ability to act in a self-preserving manner as evidenced by volitionally presenting to the clinic today, seeking treatment  To mitigate future risk, patient should adhere to the recommendations below, avoid alcohol/illicit substance use, utilize community-based resources and familiar support and prioritize mental health treatment  Presently, patient denies active suicidal/homicidal ideation in addition to thoughts of self-injury; contracts for safety  At conclusion of evaluation, patient is amenable to the recommendations below  Patient is amenable to calling/contacting the outpatient office including this writer if any acute adverse effects of their medication regimen arise in addition to any comments or concerns pertaining to their psychiatric management  Patient is amenable to calling/contacting crisis and/or attending to the nearest emergency department if their clinical condition deteriorates to assure their safety and stability, stating that they are able to appropriately confide in their family regarding their psychiatric state  Assessment/Plan:   Jennifer Zelaya is a 79-year-old male who carries a past psychiatric history significant for autism spectrum disorder ADHD that presents for establishment of care today    Reports worsening anxiety symptoms, excessive worry and rumination, mostly about money and being at places at a specific time  Feeling tense, irritable, stressed  Rare panic attacks  Also symptoms of ADHD with decreased concentration and feeling more distracted and forgetful  There is some hyperactive components as well as inattentive components  Does describe history of autism spectrum disorder, previously diagnosed with Asperger's  States that he has mostly difficulty with forming social connections although does not seem to have many other components of ASD which make it likely that if he is on the spectrum he is very high functioning  Has good insight, supports, good strengths  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[employer], 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  8/26/22 - Did not tolerate Strattera  Still struggling with attention and focus  Anxiety with work, though mostly related to the poor focus and attention at work  I feel the inadequate control of ADHD is contributing to his anxiety  Will trial Adderall 5mg BID and titrate to improve focus  Will monitor anxiety to see if this is aggravated  10/26/22 - Increased anxiety with Adderall, he discontinued it  Struggling with inattentive as well as hyperactive symptoms  Increased depressive symptoms occurring as well  There is likely a combination of anxiety and uncontrolled ADHD will that is occurring or concurrently  He is having some work impairment with his inattention and poor focus  He would likely benefit from a long-acting stimulant as opposed to immediate release to medicate the anxiety side effects  We will also start Lexapro to target depression and anxiety symptoms, he tolerated this well in the past  He has trouble remembering to take medications - I reminded him of the [address] [employer] [address]rien to assist with this  11/18/22 Tolerated the Lexapro, some minor "mental lethargy" due to it  Feels some improvement in depressive symptoms  Still anxiety and inattentive/hyperactivity   Finds he cannot sit still and feels he must be constantly moving  Poor distraction and limited focus  He is open to trialing Ritalin IR, we discussing transitioning to Concerta (which is slightly more cost effective for him with good Rx coupon)  12/29/22 - Tolerating the Ritalin -some minor improvements although difficult to truly say  No negative side effects at this time  Did stop the Lexapro as he felt it resulted in some lethargy and did not feel it helped to terribly much with anxiety or depression  Still complaining of hyperactive symptoms and inattentive symptoms  Discussed increasing the Ritalin to higher dose and potentially transitioning to Concerta in the future  He is in agreement to this  Future oriented towards upcoming trip to [place] as well as traveling to [place] for work next year  Medication trials: Strattera - "zoned out"; Adderall IR - increased anxiety    DSM-5 Diagnoses:     • Generalized anxiety disorder, attention deficit hyperactivity disorder inattentive type      Treatment Recommendations/Precautions:    • Increase Ritalin IR 5mg BID for attention and focus  • Lexapro 10mg daily for depression  • Medication management every 8 weeks  • Referral for individual psychotherapy  • Aware of 24 hour and weekend coverage for urgent situations accessed by calling [facility] main practice number    Medications Risks/Benefits      Risks, Benefits And Possible Side Effects Of Medications:    Risks, benefits, and possible side effects of medications explained to GAVIOTA Barbour including risks of cardiovascular side effects including elevated blood pressure, risk of misuse, abuse or dependence and risk of increased anxiety related to treatment with stimulant medications  He verbalizes understanding and agreement for treatment       Controlled Medication Discussion:     GAVIOTA Barbour has been filling controlled prescriptions on time as prescribed according to [facility]    Psychotherapy Provided:     Individual psychotherapy provided: Yes  Counseling was provided during the session today for 16 minutes  Recent stressor including job stress, problems at work, stress at work, recent medication change, everyday stressors and occasional anxiety discussed with Jennifer  Coping skills reviewed with Jennifer  Reassurance and supportive therapy provided        Treatment Plan:    Completed and signed during the session: Not applicable - Treatment Plan not due at this session    This note was not shared with the patient due to reasonable likelihood of causing patient harm    Visit Time    Visit Start Time: 3:02PM  Visit Stop Time: 3:28PM  Total Visit Duration: 26 minutes        Janie Mayes MD 12/29/22

## 2024-01-18 ENCOUNTER — TELEPHONE (OUTPATIENT)
Dept: PSYCHIATRY | Facility: CLINIC | Age: 34
End: 2024-01-18

## 2024-01-18 NOTE — TELEPHONE ENCOUNTER
Patient has been added to the Medication Management wait list without a referral.    Insurance: [employer]   Insurance Type:    Commercial []   Medicaid [x]   County (if applicable)   Medicare []  Location Preference: [place]  Provider Preference: [place]   Virtual: Yes [x] No []  Were outside resources sent: Yes [] No [x]  Advised the patient to contact his PCP for a referral

## 2024-03-15 ENCOUNTER — TELEPHONE (OUTPATIENT)
Dept: PSYCHIATRY | Facility: CLINIC | Age: 34
End: 2024-03-15

## 2024-03-15 NOTE — TELEPHONE ENCOUNTER
Contacted pt in regards to message received to schedule with Dr. Muñoz. RICHARDM for pt to contact the intake dept.

## 2024-03-15 NOTE — TELEPHONE ENCOUNTER
Patient called returning VM left by IC per prior note. Writer forwarded msg to IC for scheduling. Confirmed patient's phone # [phone].

## 2024-03-15 NOTE — TELEPHONE ENCOUNTER
----- Message from Madonna Arroyo sent at 3/15/2024  2:06 PM EDT -----  Regarding: RE: Previous Pt of Dr. Toni Soliz,    You may schedule any Hr. Slot that you find available.    Madonna.  ----- Message -----  From: Babs Bingham  Sent: 3/15/2024   1:58 PM EDT  To: Gomez Muñoz MD; Madonna Arroyo  Subject: FW: Previous Pt of Dr. Toni Peacock!    Last informed that Dr. Muñoz was on a hold for scheduling. As it was approved by him, could I possibly have a date to offer pt?    Thank you!  Babs  ----- Message -----  From: Navdeep Chacko  Sent: 3/15/2024   1:55 PM EDT  To: Gomez Muñoz MD; [facility]  Subject: Previous Pt of Dr. Toni Soliz!     Pt was a past patient of Dr. Muñoz's about a year ago. Can we get him scheduled for an appointment when there is an opening? Dr. Muñoz did approve it.     Thank you!!

## 2024-03-15 NOTE — TELEPHONE ENCOUNTER
Contacted patient off of Medication Management  to verify needs of services in attempts to offer patient an appointment at [facility] . spoke with patient whom stated they were interested.     Pt was scheduled on 4/26 at 2pm with Dr. Muñoz.

## 2024-04-25 NOTE — PSYCH
PSYCHIATRIC EVALUATION     [facility]    Name and Date of Birth:  Jennifer Bundy 33 y.o. 1990 MRN: [id number]    Date of Visit: April 26, 2024    Reason for visit: Initial psychiatric intake assessment    Chief complaint: I just really cannot focus right now.    History of Present Illness (HPI):      Jennifer Bundy is a 33 y.o., male, possessing a previous psychiatric history significant for ADHD, unspecified depression, generalized anxiety, autism spectrum disorder , presenting for intake assessment. Jennifer was a previous patient of this office although due to missed appointments had to be reinitiated this new patient.    Per initial evaluation on July 2022:  Jennifer Bundy is a 31 y.o., male, possessing a previous psychiatric history significant for ADHD, Asperger's, anxiety, presenting to the [facility] outpatient clinic for intake assessment. Jennifer states that he has carried past diagnosis of autism spectrum disorder, ADHD since a child.  He is followed with a therapist in the more recent times, no major medications except for some antidepressants prescribed by his PCP.  He states that over the past few months he has been experiencing worsening issues with attention and focus.  He states that he is very distractible, has difficulty with most multi tasking, he is forgetful, distracted, impulsive, difficulty with tasks that require sustained mental effort, disorganization, often blurting things out, feeling restless, hypertalkative.  He will sometimes blurt things out and finds he has difficulty interacting socially.  This plays into some of his so describe symptoms of Asperger's, reporting that he has problems connecting with people and missing social cues.  He does not have any major restricted interests or patterns, although does enjoy playing video games.  Reports that he does have some increased anxiety symptoms include excessive  "worry and rumination, often related about money and being on places at a specific time.  He does feel stressed, tense, irritable throughout the day.  He has experienced intermittent panic attacks although none in the recent time.  Reports that he does tend to “over think” situations.  He describes that sleep has been fairly well controlled.  Some minor decrease in interest levels, no guilt, no change in energy, decrease in concentration as mentioned above, no significant weight loss.  He does report feeling depressed - \"at times\" - but does not feel this is a particular problem now.  Denies any SI, HI, AVH.  Denies any paranoia or obsessions.  No bingeing or purging, no eating disorder.  No alyx or hypomania.     He states that he continues to struggle more so from attention and focus difficulties.  He has not been on medications now for many months after running out from previous prescription.  He reports that his ADHD symptoms are more prevalent at this time.  Struggling mostly with inattention and focus.  He finds that he is making careless mistakes, easily distracted, having trouble sustained mental effort at tasks.  He would frequently forget or misplace things and at times will become productive with his work.  He finds that trying to break the tasks into small steps and checklists helps though still struggles to be as effective and productive in his work.  He felt that when he was on the methylphenidate it was helpful although he would forget to take the medicine on a twice daily basis.  In terms of depression symptoms he does report that he still struggles with focusing on negative aspects of things.  He is the example of watching a movie and focusing on \"things that have gone wrong and that they should have done to improve it\".  He has some poor self-esteem at times, focusing on his negative aspects as well.  He describes some moderate anhedonia, not feeling filled or finding as much happiness or enjoyment " in activities.  He finds that this leads him to be more irritable and argumentative, sometimes arguing with friends or family.  He does note overall that his anxiety symptoms seem better managed.  Not as ruminative or over thinking of situations.  No recent panic attacks reported.  Adamantly denies any SI, HI, AVH.    Current Rating Scores:     Current PHQ-9   PHQ-2/9 Depression Screening             Psychiatric Review Of Systems:    Appetite: no  Adverse eating: no  Weight changes: increased  Insomnia/sleeplessness: no  Fatigue/anergy: no  Anhedonia/lack of interest: no  Attention/concentration: decreased  Psychomotor agitation/retardation: no  Somatic symptoms: no  Anxiety/panic attack: yes, worrying  Yamilex/hypomania: no  Hopelessness/helplessness/worthlessness: no  Self-injurious behavior/high-risk behavior: no  Suicidal ideation: no  Homicidal ideation: no  Auditory hallucinations: no  Visual hallucinations: no  Other perceptual disturbances: no  Delusional thinking: no  Obsessive/compulsive symptoms: no    Review Of Systems:    Constitutional negative   ENT negative   Cardiovascular negative   Respiratory negative   Gastrointestinal negative   Genitourinary negative   Musculoskeletal negative   Integumentary negative   Neurological negative   Endocrine negative   Other Symptoms none, all other systems are negative       Family Psychiatric History:     Mother and father with history of depression.      Past Psychiatric History:     Previous inpatient psychiatric admissions:  None.  Previous inpatient/outpatient substance abuse rehabilitation:  None.  Present/previous outpatient psychiatric treatment:  Had seen a psychiatrist during his childhood.  Past diagnosis of ADHD, Asperger.  Present/previous psychotherapy:  Saw therapist approximately 1 year ago.  History of suicidal attempts/gestures:  None.  History of violence/aggressive behaviors:  None.  Present/previous psychotropic medication use:  Concerta,  Lexapro, Celexa. Adderall, Ritalin    Substance Abuse History:    Patient denies history of alcohol, illict substance, or tobacco abuse.  Social alcohol use.  Alliac does not apear under the influence or withdrawal of any psychoactive substance throughout today's examination.     Social History:    Academic history: college graduate; home studies from [employer]  Marital history: single  Children: 0  Social support system: lives alone; has partner of 5 years  Residential history: Resides in [place]; lives alone  Vocational History: works in IT with [employer]  Access to firearms: denies direct access to weapons/firearms.   Legal History: denied    Traumatic History:     Abuse:none is reported       Past Medical History:    No past medical history on file.     No past surgical history on file.      History Review:    The following portions of the patient's history were reviewed and updated as appropriate: allergies, current medications, past family history, past medical history, past social history, past surgical history, and problem list.    OBJECTIVE:    Vital signs in last 24 hours:    Vitals:    04/26/24 1450   Weight: 78.5 kg (173 lb)   Height: 6' (1.829 m)       Mental Status Evaluation:    Appearance age appropriate, casually dressed   Behavior Cooperative, decreased eye contact   Speech normal rate, normal volume, normal pitch   Mood euthymic   Affect normal range and intensity, appropriate   Thought Processes organized, goal directed   Associations intact associations   Thought Content no overt delusions   Perceptual Disturbances: no auditory hallucinations, no visual hallucinations   Abnormal Thoughts  Risk Potential Suicidal ideation - None  Homicidal ideation - None  Potential for aggression - No   Orientation oriented to person, place, time/date, and situation   Memory recent and remote memory grossly intact   Consciousness alert and awake   Attention Span Concentration Span attention span and  concentration are age appropriate   Intellect appears to be of average intelligence   Insight intact   Judgement intact   Muscle Strength and  Gait normal muscle strength and normal muscle tone, normal gait and normal balance   Motor Activity no abnormal movements   Language no difficulty naming common objects, no difficulty repeating a phrase, no difficulty writing a sentence   Fund of Knowledge adequate knowledge of current events  adequate fund of knowledge regarding past history  adequate fund of knowledge regarding vocabulary    Pain none   Pain Scale 0       Laboratory Results: I have personally reviewed all pertinent laboratory/tests results    Recent Labs (last 4 months):   No visits with results within 4 Month(s) from this visit.   Latest known visit with results is:   No results found for any previous visit.       Suicide/Homicide Risk Assessment:      Presently, patient denies suicidal/homicidal ideation in addition to thoughts of self-injury; contracts for safety, see below for risk assessment. At conclusion of evaluation, patient is amenable to the recommendations of this writer including: therapy and medications.  Also, patient is amenable to calling/contacting the outpatient office including this writer if any acute adverse effects of their medication regimen arise in addition to any comments or concerns pertaining to their psychiatric management.  Patient is amenable to calling/contacting crisis and/or attending to the nearest emergency department if their clinical condition deteriorates to assure their safety and stability, stating that they are able to appropriately confide in their partner regarding their psychiatric state.      Assessment/Plan:     Jennifer Bundy is a 31-year-old male who carries a past psychiatric history significant for autism spectrum disorder ADHD that presents for establishment of care today.  Reports worsening anxiety symptoms, excessive worry and rumination, mostly about  "money and being at places at a specific time.  Feeling tense, irritable, stressed.  Rare panic attacks.  Also symptoms of ADHD with decreased concentration and feeling more distracted and forgetful.  There is some hyperactive components as well as inattentive components.  Does describe history of autism spectrum disorder, previously diagnosed with Asperger's.  States that he has mostly difficulty with forming social connections although does not seem to have many other components of ASD which make it likely that if he is on the spectrum he is very high functioning. Has good insight, supports, good strengths. 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    8/26/22 - Did not tolerate Strattera. Still struggling with attention and focus. Anxiety with work, though mostly related to the poor focus and attention at work. I feel the inadequate control of ADHD is contributing to his anxiety. Will trial Adderall 5mg BID and titrate to improve focus. Will monitor anxiety to see if this is aggravated.    10/26/22 - Increased anxiety with Adderall, he discontinued it.  Struggling with inattentive as well as hyperactive symptoms.  Increased depressive symptoms occurring as well.  There is likely a combination of anxiety and uncontrolled ADHD will that is occurring or concurrently.  He is having some work impairment with his inattention and poor focus.  He would likely benefit from a long-acting stimulant as opposed to immediate release to medicate the anxiety side effects.  We will also start Lexapro to target depression and anxiety symptoms, he tolerated this well in the past. He has trouble remembering to take medications - I reminded him of the [employer] adrien to assist with this.    11/18/22 Tolerated the Lexapro, some minor \"mental lethargy\" due to it. Feels some improvement in depressive symptoms. Still anxiety and inattentive/hyperactivity. Finds he cannot sit still and feels he must be " "constantly moving. Poor distraction and limited focus. He is open to trialing Ritalin IR, we discussing transitioning to Concerta (which is slightly more cost effective for him with good Rx coupon).    12/29/22 - Tolerating the Ritalin -some minor improvements although difficult to truly say.  No negative side effects at this time.  Did stop the Lexapro as he felt it resulted in some lethargy and did not feel it helped to terribly much with anxiety or depression.  Still complaining of hyperactive symptoms and inattentive symptoms.  Discussed increasing the Ritalin to higher dose and potentially transitioning to Concerta in the future.  He is in agreement to this.  Future oriented towards upcoming trip to [place] as well as traveling to [place] for work next year.    4/26/24 Returning to office as new patient; he is doing well in terms of anxiety symptoms although still has significant problems with focus and concentration.  He is interested in restarting a stimulant medication.  Twice daily dosing of immediate release medicines was difficult for him and he is open to starting Concerta at 36 mg daily to help and attention.  He does describe some ongoing cognitive distortions and selective abstraction; he is interested in psychotherapy to better target this.     Medication trials: Strattera - \"zoned out\"; Adderall IR - increased anxiety    DSM-5 Diagnoses:     Generalized anxiety disorder, attention deficit hyperactivity disorder inattentive type, unspecified depression      Treatment Recommendations/Precautions:    Concerta 36 mg daily for ADHD  Medication management every 8 weeks  Referral for individual psychotherapy  Aware of 24 hour and weekend coverage for urgent situations accessed by calling [facility] main practice number    Medications Risks/Benefits:      Risks, Benefits And Possible Side Effects Of Medications:    Risks, benefits, and possible side effects of medications explained " to Jennifer including risks of cardiovascular side effects including elevated blood pressure, risk of misuse, abuse or dependence and risk of increased anxiety related to treatment with stimulant medications. He verbalizes understanding and agreement for treatment..    Controlled Medication Discussion:     Jennifer has been filling controlled prescriptions on time as prescribed according to [place] Prescription Drug Monitoring Program    Treatment Plan:    Completed and signed during the session: Yes - with Jennifer    This note was not shared with the patient due to reasonable likelihood of causing patient harm    Visit Time    Visit Start Time: 200pm  Visit Stop Time: 250pm  Total Visit Duration:  50 minutes    Gomez Muñoz MD 04/26/24

## 2024-04-26 ENCOUNTER — OFFICE VISIT (OUTPATIENT)
Dept: PSYCHIATRY | Facility: CLINIC | Age: 34
End: 2024-04-26

## 2024-04-26 VITALS — BODY MASS INDEX: 23.43 KG/M2 | WEIGHT: 173 LBS | HEIGHT: 72 IN

## 2024-04-26 DIAGNOSIS — F41.1 GENERALIZED ANXIETY DISORDER: ICD-10-CM

## 2024-04-26 DIAGNOSIS — F32.A DEPRESSION, UNSPECIFIED DEPRESSION TYPE: ICD-10-CM

## 2024-04-26 DIAGNOSIS — F90.2 ADHD (ATTENTION DEFICIT HYPERACTIVITY DISORDER), COMBINED TYPE: Primary | ICD-10-CM

## 2024-04-26 RX ORDER — METHYLPHENIDATE HYDROCHLORIDE 36 MG/1
36 TABLET, EXTENDED RELEASE ORAL DAILY
Qty: 30 TABLET | Refills: 0 | Status: SHIPPED | OUTPATIENT
Start: 2024-04-26 | End: 2024-05-26

## 2024-04-26 NOTE — BH CRISIS PLAN
Client Name: Jennifer Bundy       Client YOB: 1990    Nitish Safety Plan      Creation Date: 4/26/24 Update Date: 4/26/25   Created By: Gomez Muñoz MD Last Updated By: Gomez Muñoz MD      Step 1: Warning Signs:   Warning Signs   Anxiety increasing            Step 2: Internal Coping Strategies:   Internal Coping Strategies   Distracting self            Step 3: People and social settings that provide distraction:   Name Contact Information   Friends             Step 4: People whom I can ask for help during a crisis:     Patient did not identify any contacts: Yes      Step 5: Professionals or agencies I can contact during a crisis:      Clinican/Agency Name Phone Emergency Contact    [facility] Emergency Department Emergency Department Phone Emergency Department Address    [facility]          Crisis Phone Numbers:   Suicide Prevention Lifeline: Call or Text  131 Crisis Text Line: Text HOME to 731-348   Please note: Some [facility] do not have a separate number for Child/Adolescent specific crisis. If your county is not listed under Child/Adolescent, please call the adult number for your county      Adult Crisis Numbers: Child/Adolescent Crisis Numbers   [facility]: [phone] [facility]: [phone]   [facility]: [phone] [facility]: [phone]   [facility]: [phone] [place]: [phone]   [facility]: [phone] [facility]: [phone]   [facility]: [phone]   [facility]: [phone]   [facility]: [phone]   [facility]: [phone] (daytime) [phone] (after hours, weekends, holidays)      Step 6: Making the environment safer (plan for lethal means safety):   Patient did not identify any lethal methods: Yes     Optional: What is most important to me and worth living for?      Nitish Safety Plan. Shanda Duncan and Justyn Calderon. Used  with permission of the authors.

## 2024-04-26 NOTE — BH TREATMENT PLAN
TREATMENT PLAN (Medication Management Only)        [facility]    Name and Date of Birth:  Jennifer Bundy 33 y.o. 1990  Date of Treatment Plan: April 26, 2024  Diagnosis/Diagnoses:    1. ADHD (attention deficit hyperactivity disorder), combined type    2. Depression, unspecified depression type      Strengths/Personal Resources for Self-Care: supportive family, ability to adapt to life changes.  Area/Areas of need (in own words): depression, ADHD symptoms  1. Long Term Goal: continue improvement in ADHD symptoms.  Target Date:6 months - 10/26/2024  Person/Persons responsible for completion of goal: Padriac  2.  Short Term Objective (s) - How will we reach this goal?:   A. Provider new recommended medication/dosage changes and/or continue medication(s): continue current medications as prescribed.  B. Attend medication management appointments regularly.  C. N/A.  Target Date:6 months - 10/26/2024  Person/Persons Responsible for Completion of Goal: Padrinick  Progress Towards Goals: continuing treatment  Treatment Modality: medication management every 3 months  Review due 180 days from date of this plan: 6 months - 10/26/2024  Expected length of service: maintenance  My Physician/PA/NP and I have developed this plan together and I agree to work on the goals and objectives. I understand the treatment goals that were developed for my treatment.

## 2024-06-12 ENCOUNTER — TELEMEDICINE (OUTPATIENT)
Dept: PSYCHIATRY | Facility: CLINIC | Age: 34
End: 2024-06-12
Payer: COMMERCIAL

## 2024-06-12 DIAGNOSIS — F41.1 GENERALIZED ANXIETY DISORDER: ICD-10-CM

## 2024-06-12 DIAGNOSIS — F84.0 AUTISM SPECTRUM DISORDER: ICD-10-CM

## 2024-06-12 DIAGNOSIS — F90.2 ADHD (ATTENTION DEFICIT HYPERACTIVITY DISORDER), COMBINED TYPE: Primary | ICD-10-CM

## 2024-06-12 PROCEDURE — 99214 OFFICE O/P EST MOD 30 MIN: CPT | Performed by: STUDENT IN AN ORGANIZED HEALTH CARE EDUCATION/TRAINING PROGRAM

## 2024-06-12 PROCEDURE — 90833 PSYTX W PT W E/M 30 MIN: CPT | Performed by: STUDENT IN AN ORGANIZED HEALTH CARE EDUCATION/TRAINING PROGRAM

## 2024-06-12 RX ORDER — METHYLPHENIDATE HYDROCHLORIDE 10 MG/1
10 TABLET ORAL
Qty: 60 TABLET | Refills: 0 | Status: SHIPPED | OUTPATIENT
Start: 2024-06-12 | End: 2024-07-12

## 2024-06-12 NOTE — PSYCH
Virtual Regular Visit    Verification of patient location:    Patient is located at Home in the following state in which I hold an active license [place]      Assessment/Plan:    Problem List Items Addressed This Visit    None  Visit Diagnoses       ADHD (attention deficit hyperactivity disorder), combined type    -  Primary    Relevant Medications    methylphenidate (Ritalin) 10 mg tablet    Generalized anxiety disorder        Relevant Medications    methylphenidate (Ritalin) 10 mg tablet    Autism spectrum disorder        Relevant Medications    methylphenidate (Ritalin) 10 mg tablet               Reason for visit is   Chief Complaint   Patient presents with    Virtual Regular Visit          Encounter provider Gomez Muñoz MD      Recent Visits  No visits were found meeting these conditions.  Showing recent visits within past 7 days and meeting all other requirements  Today's Visits  Date Type Provider Dept   06/12/24 Telemedicine Gomez Muñoz MD [facility] [address]   Showing today's visits and meeting all other requirements  Future Appointments  No visits were found meeting these conditions.  Showing future appointments within next 150 days and meeting all other requirements       The patient was identified by name and date of birth. Jennifer Bundy was informed that this is a telemedicine visit and that the visit is being conducted throughthe Epic Embedded platform. He agrees to proceed..  My office door was closed. No one else was in the room.  He acknowledged consent and understanding of privacy and security of the video platform. The patient has agreed to participate and understands they can discontinue the visit at any time.    Patient is aware this is a billable service.     Subjective  Jennifer Bundy is a 33 y.o. male .      HPI     History reviewed. No pertinent past medical history.    History reviewed. No pertinent surgical history.    Current Outpatient Medications   Medication Sig Dispense Refill     "methylphenidate (Ritalin) 10 mg tablet Take 1 tablet (10 mg total) by mouth 2 (two) times a day before breakfast and lunch Max Daily Amount: 20 mg 60 tablet 0     No current facility-administered medications for this visit.        Not on File    Review of Systems    Video Exam    There were no vitals filed for this visit.    Physical Exam     MEDICATION MANAGEMENT NOTE        [facility]      Name and Date of Birth:  Jennifer Bundy 33 y.o. 1990 MRN: [id number]    Date of Visit: June 12, 2024    Reason for Visit: Follow-up visit regarding medication management     Chief Complaint: \"The Concerta was terrible\"    SUBJECTIVE:    Jennifer Bundy is a 33 y.o., male, with a past psychiatric history significant for ADHD, unspecified depression, generalized anxiety, autism spectrum disorder, who presents for follow-up appointment for medication management. Jennifer states that since their previous outpatient psychiatric appointment, he still struggles with inattention and anxiety.  Reports that he tried Concerta for a few weeks was not too effective, but unfortunately resulted in increased anxiety symptoms and felt as though he was \"spacing out more\".  He reports feeling that it made him more tense and on edge, actually had the opposite effect with his focus and concentration and he felt as though he was more distracted and uneasy.  He tried it for a week and then discontinued it.  He reports that he still continues to struggle with inattention and focus issues.  Has trouble sustaining focus for extended periods of time, particularly on tasks that he finds boring or uninteresting.  He reports that he is able to focus on a subject of work for an extended period of time or particular interest that he has but when it comes to something that require sustained mental effort he becomes quickly distracted.  He gives example of trying to obtain a certification at work and simply " "being unable to bring himself to review the material.  He reports that he also continues to struggle with anxiety.  Feeling very tense, keyed up, restless and on edge.  Finds that this will often come out of his irritability and anger, sometimes snapping at his friends for not responding to things which he then feels guilty about.  He gives the example of change in routines or sudden change in plans create significant anxiety for him such that he will become irritable and at times passive-aggressive with his friends.  He also reports what he describes as \"decision paralysis\" -beginning to have intense anxiety when he is alone and cannot decide or figure out what he wants to do to occupy his time.  Denies any SI, HI, AVH.    Current Rating Scores:   Current PHQ-9   PHQ-2/9 Depression Screening             Psychiatric Review Of Systems:    Appetite: no  Adverse eating: no  Weight changes: no  Insomnia/sleeplessness: no  Fatigue/anergy: no  Anhedonia/lack of interest: no  Attention/concentration: decreased  Psychomotor agitation/retardation: no  Somatic symptoms: no  Anxiety/panic attack: yes, worrying  Yamilex/hypomania: no  Hopelessness/helplessness/worthlessness: no  Self-injurious behavior/high-risk behavior: no  Suicidal ideation: no  Homicidal ideation: no  Auditory hallucinations: no  Visual hallucinations: no  Other perceptual disturbances: no  Delusional thinking: no  Obsessive/compulsive symptoms: no    Review Of Systems:      Constitutional negative   ENT negative   Cardiovascular negative   Respiratory negative   Gastrointestinal negative   Genitourinary negative   Musculoskeletal negative   Integumentary negative   Neurological negative   Endocrine negative   Other Symptoms none, all other systems are negative     History Review:   The following portions of the patient's history were reviewed and updated as appropriate: allergies, current medications, past family history, past medical history, past social " history, past surgical history, and problem list. Previous progress notes/assessments from other providers reviewed as available.    Past Medical History:    History reviewed. No pertinent past medical history.     Not on File    Substance Abuse History:    Social History     Substance and Sexual Activity   Alcohol Use Yes    Comment: social use     Social History     Substance and Sexual Activity   Drug Use Never       Social History:    Social History     Socioeconomic History    Marital status: Single     Spouse name: Not on file    Number of children: 0    Years of education: Not on file    Highest education level: Bachelor's degree (e.g., BA, AB, BS)   Occupational History    Not on file   Tobacco Use    Smoking status: Never    Smokeless tobacco: Never   Vaping Use    Vaping status: Never Used   Substance and Sexual Activity    Alcohol use: Yes     Comment: social use    Drug use: Never    Sexual activity: Not on file   Other Topics Concern    Not on file   Social History Narrative    Not on file     Social Determinants of Health     Financial Resource Strain: Not on file   Food Insecurity: Not on file   Transportation Needs: Not on file   Physical Activity: Not on file   Stress: Not on file   Social Connections: Not on file   Intimate Partner Violence: Not on file   Housing Stability: Not on file       Family Psychiatric History:     History reviewed. No pertinent family history.         OBJECTIVE:     Vital signs in last 24 hours:    There were no vitals filed for this visit.    Mental Status Evaluation:    Appearance age appropriate, casually dressed   Behavior cooperative, appears anxious   Speech normal rate, normal volume, normal pitch, hypertalkative   Mood anxious   Affect normal range and intensity, appropriate   Thought Processes organized, goal directed   Associations intact associations   Thought Content no overt delusions   Perceptual Disturbances: no auditory hallucinations, no visual  hallucinations   Abnormal Thoughts  Risk Potential Suicidal ideation - None at present  Homicidal ideation - None  Potential for aggression - No   Orientation oriented to person, place, time/date, and situation   Memory recent and remote memory grossly intact   Consciousness alert and awake   Attention Span Concentration Span attention span and concentration appear shorter than expected for age   Intellect appears to be of average intelligence   Insight intact   Judgement intact   Muscle Strength and  Gait normal muscle strength and normal muscle tone, normal gait and normal balance   Motor activity no abnormal movements   Fund of Knowledge adequate knowledge of current events  adequate fund of knowledge regarding past history  adequate fund of knowledge regarding vocabulary    Pain none   Pain Scale 0       Laboratory Results: I have personally reviewed all pertinent laboratory/tests results    Recent Labs (last 4 months):   No visits with results within 4 Month(s) from this visit.   Latest known visit with results is:   No results found for any previous visit.       Suicide/Homicide Risk Assessment:    The following interventions are recommended: no intervention changes needed.     Although patient's acute lethality risk is low, long-term/chronic lethality risk is mildly elevated in the presence of current diagnoses.   At the current moment, Jennifer is future-oriented, forward-thinking, and demonstrates ability to act in a self-preserving manner as evidenced by volitionally presenting to the clinic today, seeking treatment. To mitigate future risk, patient should adhere to the recommendations below, avoid alcohol/illicit substance use, utilize community-based resources and familiar support and prioritize mental health treatment. Presently, patient denies active suicidal/homicidal ideation in addition to thoughts of self-injury; contracts for safety. Patient is amenable to calling/contacting the outpatient office  including this writer if any acute adverse effects of their medication regimen arise in addition to any comments or concerns pertaining to their psychiatric management.  Patient is amenable to calling/contacting crisis and/or attending to the nearest emergency department if their clinical condition deteriorates to assure their safety and stability, stating that they are able to appropriately confide in their family regarding their psychiatric state.    Assessment/Plan:     Jennifer Bundy is a 33-year-old male who carries a past psychiatric history significant for autism spectrum disorder ADHD that presents for establishment of care today.  Reports worsening anxiety symptoms, excessive worry and rumination, mostly about money and being at places at a specific time.  Feeling tense, irritable, stressed.  Rare panic attacks.  Also symptoms of ADHD with decreased concentration and feeling more distracted and forgetful.  There is some hyperactive components as well as inattentive components.  Does describe history of autism spectrum disorder, previously diagnosed with Asperger's.  States that he has mostly difficulty with forming social connections although does not seem to have many other components of ASD which make it likely that if he is on the spectrum he is very high functioning. Has good insight, supports, good strengths. 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    8/26/22 - Did not tolerate Strattera. Still struggling with attention and focus. Anxiety with work, though mostly related to the poor focus and attention at work. I feel the inadequate control of ADHD is contributing to his anxiety. Will trial Adderall 5mg BID and titrate to improve focus. Will monitor anxiety to see if this is aggravated.    10/26/22 - Increased anxiety with Adderall, he discontinued it.  Struggling with inattentive as well as hyperactive symptoms.  Increased depressive symptoms occurring as well.  There  "is likely a combination of anxiety and uncontrolled ADHD will that is occurring or concurrently.  He is having some work impairment with his inattention and poor focus.  He would likely benefit from a long-acting stimulant as opposed to immediate release to medicate the anxiety side effects.  We will also start Lexapro to target depression and anxiety symptoms, he tolerated this well in the past. He has trouble remembering to take medications - I reminded him of the [employer] adrien to assist with this.    11/18/22 Tolerated the Lexapro, some minor \"mental lethargy\" due to it. Feels some improvement in depressive symptoms. Still anxiety and inattentive/hyperactivity. Finds he cannot sit still and feels he must be constantly moving. Poor distraction and limited focus. He is open to trialing Ritalin IR, we discussing transitioning to Concerta (which is slightly more cost effective for him with good Rx coupon).    12/29/22 - Tolerating the Ritalin -some minor improvements although difficult to truly say.  No negative side effects at this time.  Did stop the Lexapro as he felt it resulted in some lethargy and did not feel it helped to terribly much with anxiety or depression.  Still complaining of hyperactive symptoms and inattentive symptoms.  Discussed increasing the Ritalin to higher dose and potentially transitioning to Concerta in the future.  He is in agreement to this.  Future oriented towards upcoming trip to [place] as well as traveling to [place] for work next year.    4/26/24 Returning to office as new patient; he is doing well in terms of anxiety symptoms although still has significant problems with focus and concentration.  He is interested in restarting a stimulant medication.  Twice daily dosing of immediate release medicines was difficult for him and he is open to starting Concerta at 36 mg daily to help and attention.  He does describe some ongoing cognitive distortions and selective abstraction; he " "is interested in psychotherapy to better target this.     6/12/24 Concerta was poorly tolerated feeling more anxious and distracted/\"spacey\".  He struggles ongoing with inattention symptoms as well as anxiety.  He is more aware of how he is reacting to situations and has trouble running himself from doing so which sometimes results in him guilt about passive aggressive reactions with his friends.  He is very concerned about what he describes as \"decision paralysis\" where he sits for periods of time in anxiety being unable to find something to occupy his time or distract himself.  We discussed returning back to the Ritalin 5 mg twice daily and gradually titrating to 10 mg twice daily for attention and focus which he previously did tolerate well.  Again discussed referral to psychotherapy as I feel he would benefit from cognitive and behavioral techniques to further insight and coping strategies.  He was open to this referral.  In the future we will consider adding an SSRI or SNRI although for now we will not have too many medications in light of negative reactions in the past.  We discussed potential for doing [employer] testing in future.    Medication trials: Strattera - \"zoned out\"; Adderall IR - increased anxiety; Concerta - increased anxiety and \"spacey\";    DSM-5 Diagnoses:     Generalized anxiety disorder, attention deficit hyperactivity disorder inattentive type, unspecified depression      Treatment Recommendations/Precautions:    Ritalin 5 mg twice daily for 1 week then increase to 10 mg twice daily for ADHD.  Medication management every 8 weeks  Referral for individual psychotherapy  Aware of 24 hour and weekend coverage for urgent situations accessed by calling [facility] main practice number    Medications Risks/Benefits      Risks, Benefits And Possible Side Effects Of Medications:    Risks, benefits, and possible side effects of medications explained to Padriac including risks of " cardiovascular side effects including elevated blood pressure, risk of misuse, abuse or dependence and risk of increased anxiety related to treatment with stimulant medications. He verbalizes understanding and agreement for treatment..    Controlled Medication Discussion:     Jennifer has been filling controlled prescriptions on time as prescribed according to [place] Prescription Drug Monitoring Program    Psychotherapy Provided:     Individual psychotherapy provided: Yes  Counseling was provided during the session today for 16 minutes.  Recent stressor including relationship problems, occupational problems, job stress, problems at work, recent medication change, social difficulties, and everyday stressors discussed with Jennifer.   Coping skills reviewed with Jennifer.   Reassurance and supportive therapy provided.      Treatment Plan:    Completed and signed during the session: Not applicable - Treatment Plan not due at this session    This note was not shared with the patient due to reasonable likelihood of causing patient harm    Visit Time    Visit Start Time: 530pm  Visit Stop Time: 600pm  Total Visit Duration:  30 minutes        Gomez Muñoz MD 06/12/24

## 2024-07-16 ENCOUNTER — TELEMEDICINE (OUTPATIENT)
Dept: BEHAVIORAL/MENTAL HEALTH CLINIC | Facility: CLINIC | Age: 34
End: 2024-07-16

## 2024-07-16 DIAGNOSIS — F90.2 ATTENTION DEFICIT HYPERACTIVITY DISORDER, COMBINED TYPE: Primary | ICD-10-CM

## 2024-07-16 NOTE — PSYCH
No Call. No Show. No Charge    Jennifer Bundy no showed 07/16/24 appointment , staff called and left message to reschedule appointment     Treatment Plan not due at this session.

## 2024-07-23 ENCOUNTER — TELEPHONE (OUTPATIENT)
Dept: PSYCHIATRY | Facility: CLINIC | Age: 34
End: 2024-07-23

## 2024-07-23 NOTE — TELEPHONE ENCOUNTER
One week follow up call for New Patient appointment with Thelma Morris on 9/17/24  was made on 7/16/24 Writer informed patient of New Patient paperwork needing to be completed 5 days prior to the appointment. Writer confirmed paperwork has been sent via My Chart.    Appointment was made on: 7/16

## 2024-07-31 ENCOUNTER — APPOINTMENT (OUTPATIENT)
Dept: URGENT CARE | Age: 34
End: 2024-07-31

## 2024-08-07 ENCOUNTER — TELEMEDICINE (OUTPATIENT)
Dept: PSYCHIATRY | Facility: CLINIC | Age: 34
End: 2024-08-07
Payer: COMMERCIAL

## 2024-08-07 DIAGNOSIS — F84.0 AUTISM SPECTRUM DISORDER: ICD-10-CM

## 2024-08-07 DIAGNOSIS — F41.1 GENERALIZED ANXIETY DISORDER: Primary | ICD-10-CM

## 2024-08-07 DIAGNOSIS — F32.A DEPRESSION, UNSPECIFIED DEPRESSION TYPE: ICD-10-CM

## 2024-08-07 DIAGNOSIS — F90.2 ADHD (ATTENTION DEFICIT HYPERACTIVITY DISORDER), COMBINED TYPE: ICD-10-CM

## 2024-08-07 PROCEDURE — 99214 OFFICE O/P EST MOD 30 MIN: CPT | Performed by: STUDENT IN AN ORGANIZED HEALTH CARE EDUCATION/TRAINING PROGRAM

## 2024-08-07 PROCEDURE — 90833 PSYTX W PT W E/M 30 MIN: CPT | Performed by: STUDENT IN AN ORGANIZED HEALTH CARE EDUCATION/TRAINING PROGRAM

## 2024-08-07 RX ORDER — FLUOXETINE 10 MG/1
10 CAPSULE ORAL DAILY
Qty: 30 CAPSULE | Refills: 1 | Status: SHIPPED | OUTPATIENT
Start: 2024-08-07 | End: 2024-10-06

## 2024-08-07 NOTE — PSYCH
Virtual Regular Visit    Verification of patient location:    Patient is located at Home in the following state in which I hold an active license [place]      Assessment/Plan:    Problem List Items Addressed This Visit    None  Visit Diagnoses       Generalized anxiety disorder    -  Primary    Relevant Medications    FLUoxetine (PROzac) 10 mg capsule    Depression, unspecified depression type        Relevant Medications    FLUoxetine (PROzac) 10 mg capsule    ADHD (attention deficit hyperactivity disorder), combined type        Relevant Medications    FLUoxetine (PROzac) 10 mg capsule    Autism spectrum disorder        Relevant Medications    FLUoxetine (PROzac) 10 mg capsule             Reason for visit is   Chief Complaint   Patient presents with    Virtual Regular Visit          Encounter provider Gomez Muñoz MD      Recent Visits  No visits were found meeting these conditions.  Showing recent visits within past 7 days and meeting all other requirements  Today's Visits  Date Type Provider Dept   08/07/24 Telemedicine Gomez Muñoz MD [facility] [address]   Showing today's visits and meeting all other requirements  Future Appointments  No visits were found meeting these conditions.  Showing future appointments within next 150 days and meeting all other requirements       The patient was identified by name and date of birth. Jennifer Bundy was informed that this is a telemedicine visit and that the visit is being conducted throughthe Epic Embedded platform. He agrees to proceed..  My office door was closed. No one else was in the room.  He acknowledged consent and understanding of privacy and security of the video platform. The patient has agreed to participate and understands they can discontinue the visit at any time.    Patient is aware this is a billable service.     Subjective  Jennifer Bundy is a 33 y.o. male .      HPI     History reviewed. No pertinent past medical history.    History reviewed. No  "pertinent surgical history.    Current Outpatient Medications   Medication Sig Dispense Refill    FLUoxetine (PROzac) 10 mg capsule Take 1 capsule (10 mg total) by mouth daily 30 capsule 1    methylphenidate (Ritalin) 10 mg tablet Take 1 tablet (10 mg total) by mouth 2 (two) times a day before breakfast and lunch Max Daily Amount: 20 mg 60 tablet 0     No current facility-administered medications for this visit.        Not on File    Review of Systems    Video Exam    There were no vitals filed for this visit.    Physical Exam     MEDICATION MANAGEMENT NOTE        [facility]      Name and Date of Birth:  Jennifer Bundy 33 y.o. 1990 MRN: [id number]    Date of Visit: August 7, 2024    Reason for Visit: Follow-up visit regarding medication management     Chief Complaint: \"My anxiety is up\"    SUBJECTIVE:    Jennifer Bundy is a 33 y.o., male, with a past psychiatric history significant forADHD, unspecified depression, generalized anxiety, autism spectrum disorder, who presents for follow-up appointment for medication management. Jennifer states that since their previous outpatient psychiatric appointment, he is having increased anxiety.  He reports that he has been feeling more restless and on edge.  Notes that he is having more ruminating thoughts and feeling more irritable.  He gives the example of finding out something unexpected or change in his schedule which then greatly increases in his anxiety and he has trouble \"getting that thought out of my head\".  This will affect his concentration and focus making it more difficult for him to complete tasks until a follow-up has exited his mind.  He reports that he is also been experiencing some more anhedonic symptoms due to anxiety.  Finding decreased enjoyment in activities, not being able to motivate himself and \"just not caring about things\".  Reports that work continues to be a good outlet for him although still " somewhat increasing his stress.  He notes that the anxiety did not significantly worsen with the addition of the Ritalin and he began taking it approximately 1 time daily at 5 mg due to forgetfulness and difficulty taking it later in the day.  He denies adamantly any SI or HI.    Current Rating Scores:   Current PHQ-9   PHQ-2/9 Depression Screening             Psychiatric Review Of Systems:    Appetite: no  Adverse eating: no  Weight changes: no  Insomnia/sleeplessness: no  Fatigue/anergy: no  Anhedonia/lack of interest: no  Attention/concentration: decreased  Psychomotor agitation/retardation: no  Somatic symptoms: no  Anxiety/panic attack: yes, worrying  Yamilex/hypomania: no  Hopelessness/helplessness/worthlessness: no  Self-injurious behavior/high-risk behavior: no  Suicidal ideation: no  Homicidal ideation: no  Auditory hallucinations: no  Visual hallucinations: no  Other perceptual disturbances: no  Delusional thinking: no  Obsessive/compulsive symptoms: no    Review Of Systems:      Constitutional negative   ENT negative   Cardiovascular negative   Respiratory negative   Gastrointestinal negative   Genitourinary negative   Musculoskeletal negative   Integumentary negative   Neurological negative   Endocrine negative   Other Symptoms none, all other systems are negative     History Review:   The following portions of the patient's history were reviewed and updated as appropriate: allergies, current medications, past family history, past medical history, past social history, past surgical history, and problem list. Previous progress notes/assessments from other providers reviewed as available.    Past Medical History:    History reviewed. No pertinent past medical history.     Not on File    Substance Abuse History:    Social History     Substance and Sexual Activity   Alcohol Use Yes    Comment: social use     Social History     Substance and Sexual Activity   Drug Use Never       Social History:    Social  History     Socioeconomic History    Marital status: Single     Spouse name: Not on file    Number of children: 0    Years of education: Not on file    Highest education level: Bachelor's degree (e.g., BA, AB, BS)   Occupational History    Not on file   Tobacco Use    Smoking status: Never    Smokeless tobacco: Never   Vaping Use    Vaping status: Never Used   Substance and Sexual Activity    Alcohol use: Yes     Comment: social use    Drug use: Never    Sexual activity: Not on file   Other Topics Concern    Not on file   Social History Narrative    Not on file     Social Determinants of Health     Financial Resource Strain: Not on file   Food Insecurity: Not on file   Transportation Needs: Not on file   Physical Activity: Not on file   Stress: Not on file   Social Connections: Not on file   Intimate Partner Violence: Not on file   Housing Stability: Not on file       Family Psychiatric History:     History reviewed. No pertinent family history.         OBJECTIVE:     Vital signs in last 24 hours:    There were no vitals filed for this visit.    Mental Status Evaluation:    Appearance age appropriate, casually dressed   Behavior cooperative, appears anxious   Speech normal rate, normal volume, normal pitch   Mood anxious   Affect normal range and intensity, appropriate   Thought Processes organized, goal directed   Associations intact associations   Thought Content no overt delusions   Perceptual Disturbances: no auditory hallucinations, no visual hallucinations   Abnormal Thoughts  Risk Potential Suicidal ideation - None  Homicidal ideation - None  Potential for aggression - No   Orientation oriented to person, place, time/date, and situation   Memory recent and remote memory grossly intact   Consciousness alert and awake   Attention Span Concentration Span attention span and concentration are age appropriate   Intellect appears to be of average intelligence   Insight intact   Judgement intact   Muscle Strength  and  Gait normal muscle strength and normal muscle tone, normal gait and normal balance   Motor activity no abnormal movements   Fund of Knowledge adequate knowledge of current events  adequate fund of knowledge regarding past history  adequate fund of knowledge regarding vocabulary    Pain none   Pain Scale 0       Laboratory Results: I have personally reviewed all pertinent laboratory/tests results    Recent Labs (last 4 months):   No visits with results within 4 Month(s) from this visit.   Latest known visit with results is:   No results found for any previous visit.       Suicide/Homicide Risk Assessment:    The following interventions are recommended: no intervention changes needed.     Although patient's acute lethality risk is low, long-term/chronic lethality risk is mildly elevated in the presence of current diagnoses.   At the current moment, Jennifer is future-oriented, forward-thinking, and demonstrates ability to act in a self-preserving manner as evidenced by volitionally presenting to the clinic today, seeking treatment. To mitigate future risk, patient should adhere to the recommendations below, avoid alcohol/illicit substance use, utilize community-based resources and familiar support and prioritize mental health treatment. Presently, patient denies active suicidal/homicidal ideation in addition to thoughts of self-injury; contracts for safety. Patient is amenable to calling/contacting the outpatient office including this writer if any acute adverse effects of their medication regimen arise in addition to any comments or concerns pertaining to their psychiatric management.  Patient is amenable to calling/contacting crisis and/or attending to the nearest emergency department if their clinical condition deteriorates to assure their safety and stability, stating that they are able to appropriately confide in their friends regarding their psychiatric state.    Assessment/Plan:     Jennifer Bundy is a  33-year-old male who carries a past psychiatric history significant for autism spectrum disorder ADHD that presents for establishment of care today.  Reports worsening anxiety symptoms, excessive worry and rumination, mostly about money and being at places at a specific time.  Feeling tense, irritable, stressed.  Rare panic attacks.  Also symptoms of ADHD with decreased concentration and feeling more distracted and forgetful.  There is some hyperactive components as well as inattentive components.  Does describe history of autism spectrum disorder, previously diagnosed with Asperger's.  States that he has mostly difficulty with forming social connections although does not seem to have many other components of ASD which make it likely that if he is on the spectrum he is very high functioning. Has good insight, supports, good strengths. 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    8/26/22 - Did not tolerate Strattera. Still struggling with attention and focus. Anxiety with work, though mostly related to the poor focus and attention at work. I feel the inadequate control of ADHD is contributing to his anxiety. Will trial Adderall 5mg BID and titrate to improve focus. Will monitor anxiety to see if this is aggravated.    10/26/22 - Increased anxiety with Adderall, he discontinued it.  Struggling with inattentive as well as hyperactive symptoms.  Increased depressive symptoms occurring as well.  There is likely a combination of anxiety and uncontrolled ADHD will that is occurring or concurrently.  He is having some work impairment with his inattention and poor focus.  He would likely benefit from a long-acting stimulant as opposed to immediate release to medicate the anxiety side effects.  We will also start Lexapro to target depression and anxiety symptoms, he tolerated this well in the past. He has trouble remembering to take medications - I reminded him of the [employer] adrien to  "assist with this.    11/18/22 Tolerated the Lexapro, some minor \"mental lethargy\" due to it. Feels some improvement in depressive symptoms. Still anxiety and inattentive/hyperactivity. Finds he cannot sit still and feels he must be constantly moving. Poor distraction and limited focus. He is open to trialing Ritalin IR, we discussing transitioning to Concerta (which is slightly more cost effective for him with good Rx coupon).    12/29/22 - Tolerating the Ritalin -some minor improvements although difficult to truly say.  No negative side effects at this time.  Did stop the Lexapro as he felt it resulted in some lethargy and did not feel it helped to terribly much with anxiety or depression.  Still complaining of hyperactive symptoms and inattentive symptoms.  Discussed increasing the Ritalin to higher dose and potentially transitioning to Concerta in the future.  He is in agreement to this.  Future oriented towards upcoming trip to [place] as well as traveling to [place] for work next year.    4/26/24 Returning to office as new patient; he is doing well in terms of anxiety symptoms although still has significant problems with focus and concentration.  He is interested in restarting a stimulant medication.  Twice daily dosing of immediate release medicines was difficult for him and he is open to starting Concerta at 36 mg daily to help and attention.  He does describe some ongoing cognitive distortions and selective abstraction; he is interested in psychotherapy to better target this.     6/12/24 Concerta was poorly tolerated feeling more anxious and distracted/\"spacey\".  He struggles ongoing with inattention symptoms as well as anxiety.  He is more aware of how he is reacting to situations and has trouble running himself from doing so which sometimes results in him guilt about passive aggressive reactions with his friends.  He is very concerned about what he describes as \"decision paralysis\" where he sits for " "periods of time in anxiety being unable to find something to occupy his time or distract himself.  We discussed returning back to the Ritalin 5 mg twice daily and gradually titrating to 10 mg twice daily for attention and focus which he previously did tolerate well.  Again discussed referral to psychotherapy as I feel he would benefit from cognitive and behavioral techniques to further insight and coping strategies.  He was open to this referral.  In the future we will consider adding an SSRI or SNRI although for now we will not have too many medications in light of negative reactions in the past.  We discussed potential for doing Genesight testing in future.    8/7/24 Feeling more stressed and anxious; does not correlate this to the Ritalin. Only taking once daily due to forgetfulness. More ruminating thoughts which leads to some anhedonia. We discussed various medication options and he agreed to trial Prozac which I feel will be beneficial to him due to its lengthy half-life and his propensity to forget taking his medications.    Medication trials: Strattera - \"zoned out\"; Adderall IR - increased anxiety; Concerta - increased anxiety and \"spacey\";  Lexapro - \"tired\";     DSM-5 Diagnoses:     Generalized anxiety disorder, attention deficit hyperactivity disorder inattentive type, unspecified depression      Treatment Recommendations/Precautions:    Ritalin 5 mg twice daily for ADHD  Prozac 10mg daily for depression/anxiety  Medication management every 8 weeks  Referral for individual psychotherapy  Aware of 24 hour and weekend coverage for urgent situations accessed by calling [facility] main practice number    Medications Risks/Benefits      Risks, Benefits And Possible Side Effects Of Medications:    Risks, benefits, and possible side effects of medications explained to Padriac including risk of suicidality and serotonin syndrome related to treatment with antidepressants and risks of " cardiovascular side effects including elevated blood pressure, risk of misuse, abuse or dependence and risk of increased anxiety related to treatment with stimulant medications. He verbalizes understanding and agreement for treatment..    Controlled Medication Discussion:     Jennifer has been filling controlled prescriptions on time as prescribed according to [place] Prescription Drug Monitoring Program    Psychotherapy Provided:     Individual psychotherapy provided: Yes  Counseling was provided during the session today for 16 minutes.  Recent stressor including occupational problems, job stress, problems at work, stress at work, medical problems, recent medication change, everyday stressors, occasional anxiety, and chronic anxiety discussed with Jennifer.   Coping skills reviewed with Jennifer.   Reassurance and supportive therapy provided.      Treatment Plan:    Completed and signed during the session: Not applicable - Treatment Plan to be completed by [facility] therapist    This note was not shared with the patient due to reasonable likelihood of causing patient harm    Visit Time    Visit Start Time: 508pm  Visit Stop Time: 526pm  Total Visit Duration:  18 minutes        Gomez Muñoz MD 08/07/24

## 2024-09-17 ENCOUNTER — TELEMEDICINE (OUTPATIENT)
Dept: BEHAVIORAL/MENTAL HEALTH CLINIC | Facility: CLINIC | Age: 34
End: 2024-09-17
Payer: COMMERCIAL

## 2024-09-17 DIAGNOSIS — F90.2 ATTENTION DEFICIT HYPERACTIVITY DISORDER, COMBINED TYPE: Primary | ICD-10-CM

## 2024-09-17 DIAGNOSIS — F43.21 ADJUSTMENT DISORDER WITH DEPRESSED MOOD: ICD-10-CM

## 2024-09-17 PROCEDURE — 90791 PSYCH DIAGNOSTIC EVALUATION: CPT

## 2024-09-17 NOTE — PSYCH
" Behavioral Health Psychotherapy Assessment    Date of Initial Psychotherapy Assessment: 09/19/24  Referral Source: \"self\"  Has a release of information been signed for the referral source? NA    Preferred Name: Jennifer Bundy  Preferred Pronouns: He/him  YOB: 1990 Age: 33 y.o.  Sex assigned at birth: male   Gender Identity: \"male\"  Race:   Preferred Language: English    Emergency Contact:  Full Name: not listed  Relationship to Client: not listed  Contact information: not listed    Primary Care Physician:  No primary care provider on file.  No primary provider on file.  None  Has a release of information been signed? NA    Physical Health History:  Past surgical procedures: none  Do you have a history of any of the following: other none  Do you have any mobility issues? No    Relevant Family History:  \"I was rasied in a fairly well off family. My parents were miserable with each other. They still are. I think my dad just accepted he is going to be miserable for the rest of his life. I have one sister who makes a lot of money. She is fairly controlling and I wish she reached out to me more. I'm not super close to my family as my parents were not the nicest.\"     Presenting Problem (What brings you in?)  \"I was bullied a lot as a kid in school and I'm sure that has had its impact. Mostly I am just super anxious about random things. Like money I yvonne money even though I don;t have to. I also get unnecessarily upset when plans change one me or I have to spend money out of nowhere.\"    Mental Health Advance Directive:  Do you currently have a Mental Health Advance Directive?no    Diagnosis:   Diagnosis ICD-10-CM Associated Orders   1. Attention deficit hyperactivity disorder, combined type  F90.2       2. Adjustment disorder with depressed mood  F43.21           Initial Assessment:     Current Mental Status:    Appearance: appropriate, casual and neat      Behavior/Manner: cooperative      " "Affect/Mood:  Stable    Speech:  Normal    Sleep:  Normal    Oriented to: oriented to self, oriented to place and oriented to time       Clinical Symptoms    Anxiety: yes      Anxiety Symptoms: excessive worry, nervous/anxious and difficulty controlling worry      Have you ever been assaultive to others or the environment: No      Have you ever been self-injurious: No      Counseling History:  Previous Counseling or Treatment  (Mental Health or Drug & Alcohol): No    Have you previously taken psychiatric medications: Yes    Previous Medications Attempted:  Recently stated a anti depressant    Suicide Risk Assessment  Have you ever had a suicide attempt: No    Have you had incidents of suicidal ideation: No    Are you currently experiencing suicidal thoughts: No      Substance Abuse/Addiction Assessment:  Alcohol: Yes    Age of First Use:  23  Frequency:  Monthly  Amount:  One or two drinks at most  Last use:  Weeks ago  Heroin: No    Fentanyl: No    Opiates: No    Cocaine: No    Amphetamines: No    Hallucinogens: No    Club Drugs: No    Benzodiazepines: No    Other Rx Meds: No    Marijuana: No    Tobacco/Nicotine: No    Have you experienced blackouts as a result of substance use: No    Have you had any periods of abstinence: No    Have you experienced symptoms of withdrawal: No    Have you ever overdosed on any substances?: No    Are you currently using any Medication Assisted Treatment for Substance Use: No      Compulsive Behaviors:  Compulsive Behavior Information:  \"None, maybe saving money\"    Disordered Eating History:  Do you have a history of disordered eating: No      Social Determinants of Health:    SDOH:  Stress and financial instability    Trauma and Abuse History:    Have you ever been abused: No      Legal History:    Have you ever been arrested  or had a DUI: No      Have you been incarcerated: No      Are you currently on parole/probation: No      Any current Children and Youth involvement: No      " Any pending legal charges: No      Relationship History:    Current marital status: single      Natural Supports:  Mother, father and siblings    Employment History    Are you currently employed: Yes      Longest period of employment:  Years    Employer/ Job title:  [employer]     Future work goals:  Network security    Sources of income/financial support:  Work     History:      Status: no history of  duty  Educational History:     Have you ever been diagnosed with a learning disability: No      Highest level of education:  Bachelor's Degree    Have you ever had an IEP or 504-plan: Yes      IEP/504 plan:  Diagnosed with aspergers    Do you need assistance with reading or writing: No      Recommended Treatment:     Psychotherapy:  Individual sessions    Frequency:  1 time    Session frequency:  Monthly      Visit start and stop times:    09/17/2024  Start Time: 1300  Stop Time: 1352  Total Visit Time: 52 minutes    Virtual Regular Visit    Verification of patient location:    Patient is located at Home in the following state in which I hold an active license [place]      Assessment/Plan:    Problem List Items Addressed This Visit    None  Visit Diagnoses       Attention deficit hyperactivity disorder, combined type    -  Primary    Adjustment disorder with depressed mood                Goals addressed in session: Goal 1 and Goal 2          Reason for visit is   Chief Complaint   Patient presents with   • Virtual Regular Visit          Encounter provider Thelma Webb      Recent Visits  Date Type Provider Dept   09/17/24 Telemedicine Thelma Morris [facility] [address]   Showing recent visits within past 7 days and meeting all other requirements  Future Appointments  No visits were found meeting these conditions.  Showing future appointments within next 150 days and meeting all other requirements       The patient was identified by name and date of birth.  Jennifer Bundy was informed that this is a telemedicine visit and that the visit is being conducted throughthe Eyefreight platform. He agrees to proceed..  My office door was closed. No one else was in the room.  He acknowledged consent and understanding of privacy and security of the video platform. The patient has agreed to participate and understands they can discontinue the visit at any time.    Patient is aware this is a billable service.       HPI     No past medical history on file.    No past surgical history on file.    Current Outpatient Medications   Medication Sig Dispense Refill   • FLUoxetine (PROzac) 10 mg capsule Take 1 capsule (10 mg total) by mouth daily 30 capsule 1   • methylphenidate (Ritalin) 10 mg tablet Take 1 tablet (10 mg total) by mouth 2 (two) times a day before breakfast and lunch Max Daily Amount: 20 mg 60 tablet 0     No current facility-administered medications for this visit.        Not on File    Review of Systems   Psychiatric/Behavioral:  The patient is nervous/anxious.        Video Exam    There were no vitals filed for this visit.    Physical Exam  Psychiatric:         Behavior: Behavior is cooperative.        Visit Time    Visit Start Time: 1300  Visit Stop Time: 1352  Total Visit Duration:  52 minutes

## 2024-10-10 DIAGNOSIS — F41.1 GENERALIZED ANXIETY DISORDER: ICD-10-CM

## 2024-10-10 DIAGNOSIS — F32.A DEPRESSION, UNSPECIFIED DEPRESSION TYPE: ICD-10-CM

## 2024-10-11 RX ORDER — FLUOXETINE 10 MG/1
10 CAPSULE ORAL DAILY
Qty: 30 CAPSULE | Refills: 1 | Status: SHIPPED | OUTPATIENT
Start: 2024-10-11

## 2024-11-04 ENCOUNTER — TELEPHONE (OUTPATIENT)
Age: 34
End: 2024-11-04

## 2024-11-04 NOTE — TELEPHONE ENCOUNTER
Patient is calling regarding cancelling an appointment.    Date/Time: 11/7/24 @5    Reason: Will be on a plane    Patient was rescheduled: YES [x] NO []  If yes, when was Patient reschedule for: 11/27 @8    Patient requesting call back to reschedule: YES [] NO [x]    Patient also updated their insurance coverage to include [employer]. Writer added coverage to the chart.

## 2024-11-27 ENCOUNTER — TELEPHONE (OUTPATIENT)
Dept: PSYCHIATRY | Facility: CLINIC | Age: 34
End: 2024-11-27

## 2024-11-27 NOTE — TELEPHONE ENCOUNTER
Writer called and left message to reschedule appointment for today at 8am. Therapist is out of the office. Call back number has been provided.

## 2024-12-02 DIAGNOSIS — F32.A DEPRESSION, UNSPECIFIED DEPRESSION TYPE: ICD-10-CM

## 2024-12-02 DIAGNOSIS — F41.1 GENERALIZED ANXIETY DISORDER: ICD-10-CM

## 2024-12-02 NOTE — TELEPHONE ENCOUNTER
Patient called to request refill.  He requested that pharmacy be updated to [facility] on [address]  Updated in chart.  Patient has enough medication for a few days.    Will refer to Dr Muñoz for refill upon his return to the office.

## 2024-12-03 RX ORDER — FLUOXETINE 10 MG/1
10 CAPSULE ORAL DAILY
Qty: 30 CAPSULE | Refills: 1 | Status: SHIPPED | OUTPATIENT
Start: 2024-12-03

## 2024-12-16 ENCOUNTER — TELEMEDICINE (OUTPATIENT)
Dept: BEHAVIORAL/MENTAL HEALTH CLINIC | Facility: CLINIC | Age: 34
End: 2024-12-16
Payer: COMMERCIAL

## 2024-12-16 DIAGNOSIS — F90.2 ATTENTION DEFICIT HYPERACTIVITY DISORDER, COMBINED TYPE: Primary | ICD-10-CM

## 2024-12-16 PROCEDURE — 90834 PSYTX W PT 45 MINUTES: CPT

## 2024-12-16 NOTE — PSYCH
"Behavioral Health Psychotherapy Progress Note    Psychotherapy Provided: Individual Psychotherapy     1. Attention deficit hyperactivity disorder, combined type            Goals addressed in session: Goal 1 and Goal 2     DATA: Therapist began session with completion of check in. Gregor reported doing OK. Therapist asked how his trip had gone. Gregor reported that the trip was primarily positive. Therapist has there have been any updates in its life since last session. Gregor denied any updates. Therapist asked if they could complete the treatment plan. Gregor agreed. Therapist explained the structure and method of the treatment plan. Gregor reported that addressing his negative thoughts, anxiety and struggles with spending money to be his primary goals and concerns. Therapist supported in identifying objectives for these goals. After reviewing treatment plan, therapist asked for Gregor to elaborate further on how some of his behaviors and anxious thoughts look. Gregor described his negative thoughts for the remainder of session.  During this session, this clinician used the following therapeutic modalities: Client-centered Therapy, Cognitive Behavioral Therapy, Solution-Focused Therapy, and Supportive Psychotherapy    Substance Abuse was not addressed during this session. If the client is diagnosed with a co-occurring substance use disorder, please indicate any changes in the frequency or amount of use: n/a. Stage of change for addressing substance use diagnoses: No substance use/Not applicable    ASSESSMENT:  Jennifer Bundy presents with a Euthymic/ normal mood.     his affect is Normal range and intensity, which is congruent, with his mood and the content of the session. The client has made progress on their goals.     Jennifer Bundy presents with a none risk of suicide, none risk of self-harm, and none risk of harm to others.    For any risk assessment that surpasses a \"low\" rating, a safety plan must be developed.    A " safety plan was indicated: no  If yes, describe in detail n/a    PLAN: Between sessions, Jennifer Bundy will continue to reflect on the comparison he makes with others. At the next session, the therapist will use Client-centered Therapy, Cognitive Behavioral Therapy, Solution-Focused Therapy, and Supportive Psychotherapy to address negative thoughts.    Behavioral Health Treatment Plan and Discharge Planning: Jennifer Bundy is aware of and agrees to continue to work on their treatment plan. They have identified and are working toward their discharge goals. yes    Depression Follow-up Plan Completed: No    Visit start and stop times:    12/16/24  Start Time: 0800  Stop Time: 0850  Total Visit Time: 50 minutes    Virtual Regular Visit    Verification of patient location:    Patient is located at Home in the following state in which I hold an active license [place]      Assessment/Plan:    Problem List Items Addressed This Visit    None  Visit Diagnoses         Attention deficit hyperactivity disorder, combined type    -  Primary            Goals addressed in session: Goal 1 and Goal 2     Depression Follow-up Plan Completed: No    Reason for visit is   Chief Complaint   Patient presents with    Virtual Regular Visit          Encounter provider Thelma Gonzales      Recent Visits  No visits were found meeting these conditions.  Showing recent visits within past 7 days and meeting all other requirements  Today's Visits  Date Type Provider Dept   12/16/24 Telemedicine Thelma Gonzales [facility] Therapist [address]   Showing today's visits and meeting all other requirements  Future Appointments  No visits were found meeting these conditions.  Showing future appointments within next 150 days and meeting all other requirements       The patient was identified by name and date of birth. Jennifer Bundy was informed that this is a telemedicine visit and that the visit is being conducted throughthe Staff [employer] platform. He  agrees to proceed..  My office door was closed. No one else was in the room.  He acknowledged consent and understanding of privacy and security of the video platform. The patient has agreed to participate and understands they can discontinue the visit at any time.    Patient is aware this is a billable service.       HPI     No past medical history on file.    No past surgical history on file.    Current Outpatient Medications   Medication Sig Dispense Refill    FLUoxetine (PROzac) 10 mg capsule Take 1 capsule (10 mg total) by mouth daily 30 capsule 1    methylphenidate (Ritalin) 10 mg tablet Take 1 tablet (10 mg total) by mouth 2 (two) times a day before breakfast and lunch Max Daily Amount: 20 mg 60 tablet 0     No current facility-administered medications for this visit.        Not on File    Review of Systems    Video Exam    There were no vitals filed for this visit.    Physical Exam     Visit Time    Visit Start Time: 0800  Visit Stop Time: 0850  Total Visit Duration:  50 minutes

## 2024-12-16 NOTE — BH TREATMENT PLAN
"Outpatient Behavioral Health Psychotherapy Treatment Plan    Jennifer Bundy  1990     Date of Initial Psychotherapy Assessment: 09/17/2024   Date of Current Treatment Plan: 12/16/24  Treatment Plan Target Date: 05/16/2025  Treatment Plan Expiration Date: 05/16/2025    Diagnosis:   1. Attention deficit hyperactivity disorder, combined type            Area(s) of Need: Anxiousness, indecisive    Long Term Goal 1 (in the client's own words): \"feeling indecisive about spending and then just being anxious\"    Stage of Change: Action    Target Date for completion: 05/16/2025     Anticipated therapeutic modalities: CBT, supportive therapy     People identified to complete this goal: Gregor, Therapist       Objective 1: (identify the means of measuring success in meeting the objective): Gregor will complete confidence building exercises      Objective 2: (identify the means of measuring success in meeting the objective): Gregor will challenge the anxious thoughts of reality testing      Long Term Goal 2 (in the client's own words): \"the negativity, I get very negative about things\"    Stage of Change: Action    Target Date for completion: 05/16/2025     Anticipated therapeutic modalities: CBT,      People identified to complete this goal: Gregor       Objective 1: (identify the means of measuring success in meeting the objective): Gregor will explore reasons or patterns behind his negative thoughts      Objective 2: (identify the means of measuring success in meeting the objective): Gregor will complete CBT exercises that shift fro negative mindsets      I am currently under the care of a [facility] psychiatric provider: yes    My [facility] psychiatric provider is: Dr. Gomez Muñoz     I am currently taking psychiatric medications: Yes, as prescribed    I feel that I will be ready for discharge from mental health care when I reach the following (measurable goal/objective): \"reduction in negative thoughts\"    For children and " adults who have a legal guardian:   Has there been any change to custody orders and/or guardianship status? NA. If yes, attach updated documentation.    I have created my Crisis Plan and have been offered a copy of this plan    Behavioral Health Treatment Plan [facility]: Diagnosis and Treatment Plan explained to Jennifer Bundy acknowledges an understanding of their diagnosis. Jennifer Bundy agrees to this treatment plan.    I have been offered a copy of this Treatment Plan. yes

## 2024-12-30 ENCOUNTER — TELEMEDICINE (OUTPATIENT)
Dept: PSYCHIATRY | Facility: CLINIC | Age: 34
End: 2024-12-30
Payer: COMMERCIAL

## 2024-12-30 DIAGNOSIS — F41.1 GENERALIZED ANXIETY DISORDER: ICD-10-CM

## 2024-12-30 DIAGNOSIS — F90.2 ADHD (ATTENTION DEFICIT HYPERACTIVITY DISORDER), COMBINED TYPE: Primary | ICD-10-CM

## 2024-12-30 DIAGNOSIS — F84.0 AUTISM SPECTRUM DISORDER: ICD-10-CM

## 2024-12-30 DIAGNOSIS — F32.A DEPRESSION, UNSPECIFIED DEPRESSION TYPE: ICD-10-CM

## 2024-12-30 PROCEDURE — 99214 OFFICE O/P EST MOD 30 MIN: CPT | Performed by: STUDENT IN AN ORGANIZED HEALTH CARE EDUCATION/TRAINING PROGRAM

## 2024-12-30 PROCEDURE — 90833 PSYTX W PT W E/M 30 MIN: CPT | Performed by: STUDENT IN AN ORGANIZED HEALTH CARE EDUCATION/TRAINING PROGRAM

## 2024-12-30 RX ORDER — LISDEXAMFETAMINE DIMESYLATE 30 MG/1
30 CAPSULE ORAL EVERY MORNING
Qty: 30 CAPSULE | Refills: 0 | Status: SHIPPED | OUTPATIENT
Start: 2024-12-30

## 2024-12-30 RX ORDER — FLUOXETINE 10 MG/1
10 CAPSULE ORAL DAILY
Qty: 30 CAPSULE | Refills: 2 | Status: SHIPPED | OUTPATIENT
Start: 2024-12-30

## 2024-12-30 NOTE — PSYCH
Virtual Regular Visit    Verification of patient location:    Patient is located at Home in the following state in which I hold an active license [place]      Assessment/Plan:    Problem List Items Addressed This Visit    None  Visit Diagnoses         ADHD (attention deficit hyperactivity disorder), combined type    -  Primary    Relevant Medications    lisdexamfetamine (Vyvanse) 30 MG capsule    FLUoxetine (PROzac) 10 mg capsule      Depression, unspecified depression type        Relevant Medications    lisdexamfetamine (Vyvanse) 30 MG capsule    FLUoxetine (PROzac) 10 mg capsule      Generalized anxiety disorder        Relevant Medications    lisdexamfetamine (Vyvanse) 30 MG capsule    FLUoxetine (PROzac) 10 mg capsule      Autism spectrum disorder        Relevant Medications    lisdexamfetamine (Vyvanse) 30 MG capsule    FLUoxetine (PROzac) 10 mg capsule          Reason for visit is   Chief Complaint   Patient presents with    Virtual Regular Visit          Encounter provider Gomez Muñoz MD      Recent Visits  No visits were found meeting these conditions.  Showing recent visits within past 7 days and meeting all other requirements  Today's Visits  Date Type Provider Dept   12/30/24 Telemedicine Gomez Muñoz MD [facility]   Showing today's visits and meeting all other requirements  Future Appointments  No visits were found meeting these conditions.  Showing future appointments within next 150 days and meeting all other requirements       The patient was identified by name and date of birth. Jennifer Bundy was informed that this is a telemedicine visit and that the visit is being conducted throughthe Epic Embedded platform. He agrees to proceed..  My office door was closed. No one else was in the room.  He acknowledged consent and understanding of privacy and security of the video platform. The patient has agreed to participate and understands they can discontinue the visit at any time.    Patient is  "aware this is a billable service.     Subjective  Jennifer Bundy is a 34 y.o. male .      HPI     History reviewed. No pertinent past medical history.    History reviewed. No pertinent surgical history.    Current Outpatient Medications   Medication Sig Dispense Refill    FLUoxetine (PROzac) 10 mg capsule Take 1 capsule (10 mg total) by mouth daily 30 capsule 2    lisdexamfetamine (Vyvanse) 30 MG capsule Take 1 capsule (30 mg total) by mouth every morning Max Daily Amount: 30 mg 30 capsule 0     No current facility-administered medications for this visit.        Not on File    Review of Systems    Video Exam    There were no vitals filed for this visit.    Physical Exam     Medication Management  [facility]         Name and Date of Birth:  Jennifer Bundy 34 y.o. 1990 MRN: [id number]    Date of Visit: December 30, 2024    Reason for Visit: Follow-up visit regarding medication management     Chief Complaint: \"Things are sort of OK\"    SUBJECTIVE:    Jennifer Bundy is a 34 y.o., male, with a past psychiatric history significant for ADHD, unspecified depression, generalized anxiety, autism spectrum disorder, who presents for follow-up appointment for medication management. Jennifer states that since their previous outpatient psychiatric appointment, he is doing \"okay\".  Reports that his attention and focus concentration still remain diminished.  He reports that he has not been taking the Ritalin.  Reports that he is struggling with focus and concentration, easily distracted, unable to maintain focus for long periods of time.  Becomes bored and requires redirection.  He reports that he continues to experience hyperactivity, feeling very restless particularly when he is not occupied or busy.  He continues to describe a sort of \"decision paralysis\" and feels that when he is not scheduled or structured to do something he becomes more anxious and uneasy.  Gives the example of being better structured at work " with less anxiety and when he returns home he does not have as much to maintain or occupy himself which leads to his anxiety heightening.  He reports that he stopped taking the Ritalin due to trouble remembering to take it.  He reports that he has been traveling with his girlfriend and has some upcoming vacation to [place] that he is looking forward to.  Denies any SI, HI, AVH.  Reports that the Prozac has been well tolerated thus far although feels it has not significantly improved or changed anxiety symptoms as of yet.    Current Rating Scores:   Current PHQ-9   PHQ-2/9 Depression Screening             Psychiatric Review Of Systems:    Appetite: no  Adverse eating: no  Weight changes: no  Insomnia/sleeplessness: no  Fatigue/anergy: no  Anhedonia/lack of interest: no  Attention/concentration: no  Psychomotor agitation/retardation: no  Somatic symptoms: no  Anxiety/panic attack: yes  Yamilex/hypomania: no  Hopelessness/helplessness/worthlessness: no  Self-injurious behavior/high-risk behavior: no  Suicidal ideation: no  Homicidal ideation: no  Auditory hallucinations: no  Visual hallucinations: no  Other perceptual disturbances: no  Delusional thinking: no  Obsessive/compulsive symptoms: no    Review Of Systems:      Constitutional negative   ENT negative   Cardiovascular negative   Respiratory negative   Gastrointestinal negative   Genitourinary negative   Musculoskeletal negative   Integumentary negative   Neurological negative   Endocrine negative   Other Symptoms none, all other systems are negative     History Review:   The following portions of the patient's history were reviewed and updated as appropriate: allergies, current medications, past family history, past medical history, past social history, past surgical history, and problem list. Previous progress notes/assessments from other providers reviewed as available.    Past Medical History:    History reviewed. No pertinent past medical history.     Not on  File    Substance Abuse History:    Social History     Substance and Sexual Activity   Alcohol Use Yes    Comment: social use     Social History     Substance and Sexual Activity   Drug Use Never       Social History:    Social History     Socioeconomic History    Marital status: Single     Spouse name: Not on file    Number of children: 0    Years of education: Not on file    Highest education level: Bachelor's degree (e.g., BA, AB, BS)   Occupational History    Not on file   Tobacco Use    Smoking status: Never    Smokeless tobacco: Never   Vaping Use    Vaping status: Never Used   Substance and Sexual Activity    Alcohol use: Yes     Comment: social use    Drug use: Never    Sexual activity: Not on file   Other Topics Concern    Not on file   Social History Narrative    Not on file     Social Drivers of Health     Financial Resource Strain: Not on file   Food Insecurity: Not on file   Transportation Needs: Not on file   Physical Activity: Not on file   Stress: Not on file   Social Connections: Not on file   Intimate Partner Violence: Not on file   Housing Stability: Not on file       Family Psychiatric History:     History reviewed. No pertinent family history.         OBJECTIVE:     Vital signs in last 24 hours:    There were no vitals filed for this visit.    Mental Status Evaluation:    Appearance age appropriate, casually dressed   Behavior cooperative, calm   Speech normal rate, normal volume, normal pitch   Mood anxious   Affect constricted   Thought Processes organized, goal directed   Associations intact associations   Thought Content no overt delusions   Perceptual Disturbances: no auditory hallucinations, no visual hallucinations   Abnormal Thoughts  Risk Potential Suicidal ideation - None  Homicidal ideation - None  Potential for aggression - No   Orientation oriented to person, place, time/date, and situation   Memory recent and remote memory grossly intact   Consciousness alert and awake   Attention  Span Concentration Span attention span and concentration are age appropriate   Intellect appears to be of average intelligence   Insight intact   Judgement intact   Muscle Strength and  Gait normal muscle strength and normal muscle tone, normal gait and normal balance   Motor activity no abnormal movements   Fund of Knowledge adequate knowledge of current events  adequate fund of knowledge regarding past history  adequate fund of knowledge regarding vocabulary    Pain none   Pain Scale 0       Laboratory Results: I have personally reviewed all pertinent laboratory/tests results    Recent Labs (last 4 months):   No visits with results within 4 Month(s) from this visit.   Latest known visit with results is:   No results found for any previous visit.         Summary:  Jennifer Bundy is a 33-year-old male who carries a past psychiatric history significant for autism spectrum disorder ADHD that presents for establishment of care today.  Reports worsening anxiety symptoms, excessive worry and rumination, mostly about money and being at places at a specific time.  Feeling tense, irritable, stressed.  Rare panic attacks.  Also symptoms of ADHD with decreased concentration and feeling more distracted and forgetful.  There is some hyperactive components as well as inattentive components.  Does describe history of autism spectrum disorder, previously diagnosed with Asperger's.  States that he has mostly difficulty with forming social connections although does not seem to have many other components of ASD which make it likely that if he is on the spectrum he is very high functioning. Has good insight, supports, good strengths. 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    8/26/22 - Did not tolerate Strattera. Still struggling with attention and focus. Anxiety with work, though mostly related to the poor focus and attention at work. I feel the inadequate control of ADHD is contributing to his  "anxiety. Will trial Adderall 5mg BID and titrate to improve focus. Will monitor anxiety to see if this is aggravated.    10/26/22 - Increased anxiety with Adderall, he discontinued it.  Struggling with inattentive as well as hyperactive symptoms.  Increased depressive symptoms occurring as well.  There is likely a combination of anxiety and uncontrolled ADHD will that is occurring or concurrently.  He is having some work impairment with his inattention and poor focus.  He would likely benefit from a long-acting stimulant as opposed to immediate release to medicate the anxiety side effects.  We will also start Lexapro to target depression and anxiety symptoms, he tolerated this well in the past. He has trouble remembering to take medications - I reminded him of the [employer] adrien to assist with this.    11/18/22 Tolerated the Lexapro, some minor \"mental lethargy\" due to it. Feels some improvement in depressive symptoms. Still anxiety and inattentive/hyperactivity. Finds he cannot sit still and feels he must be constantly moving. Poor distraction and limited focus. He is open to trialing Ritalin IR, we discussing transitioning to Concerta (which is slightly more cost effective for him with good Rx coupon).    12/29/22 - Tolerating the Ritalin -some minor improvements although difficult to truly say.  No negative side effects at this time.  Did stop the Lexapro as he felt it resulted in some lethargy and did not feel it helped to terribly much with anxiety or depression.  Still complaining of hyperactive symptoms and inattentive symptoms.  Discussed increasing the Ritalin to higher dose and potentially transitioning to Concerta in the future.  He is in agreement to this.  Future oriented towards upcoming trip to [place] as well as traveling to [place] for work next year.    4/26/24 Returning to office as new patient; he is doing well in terms of anxiety symptoms although still has significant problems with focus " "and concentration.  He is interested in restarting a stimulant medication.  Twice daily dosing of immediate release medicines was difficult for him and he is open to starting Concerta at 36 mg daily to help and attention.  He does describe some ongoing cognitive distortions and selective abstraction; he is interested in psychotherapy to better target this.     6/12/24 Concerta was poorly tolerated feeling more anxious and distracted/\"spacey\".  He struggles ongoing with inattention symptoms as well as anxiety.  He is more aware of how he is reacting to situations and has trouble running himself from doing so which sometimes results in him guilt about passive aggressive reactions with his friends.  He is very concerned about what he describes as \"decision paralysis\" where he sits for periods of time in anxiety being unable to find something to occupy his time or distract himself.  We discussed returning back to the Ritalin 5 mg twice daily and gradually titrating to 10 mg twice daily for attention and focus which he previously did tolerate well.  Again discussed referral to psychotherapy as I feel he would benefit from cognitive and behavioral techniques to further insight and coping strategies.  He was open to this referral.  In the future we will consider adding an SSRI or SNRI although for now we will not have too many medications in light of negative reactions in the past.  We discussed potential for doing Genes[employer] testing in future.    8/7/24 Feeling more stressed and anxious; does not correlate this to the Ritalin. Only taking once daily due to forgetfulness. More ruminating thoughts which leads to some anhedonia. We discussed various medication options and he agreed to trial Prozac which I feel will be beneficial to him due to its lengthy half-life and his propensity to forget taking his medications.    12/30/24 He is feeling distracted and inattentive; ongoing hyperactive symptoms. Stopped the Ritalin due to " "forgetting to take it. He is interested in adjusting to an extended release formulation, he got no insurance and Vyvanse is now covered.  We discussed switching to this to see if this will better improve focus and inattentive symptoms and will improve compliance due to once daily dosing.  We will keep Prozac the same at this time, he is tolerating it well thus far although has not had significant improvement or change in anxiety symptoms.  Would like to see if ADHD is better if other symptoms improve prior to making adjustments in Prozac.    Medication trials: Strattera - \"zoned out\"; Adderall IR - increased anxiety; Concerta - increased anxiety and \"spacey\";  Lexapro - \"tired\";     Assessment & Plan  ADHD (attention deficit hyperactivity disorder), combined type    Orders:    lisdexamfetamine (Vyvanse) 30 MG capsule; Take 1 capsule (30 mg total) by mouth every morning Max Daily Amount: 30 mg    Depression, unspecified depression type    Orders:    FLUoxetine (PROzac) 10 mg capsule; Take 1 capsule (10 mg total) by mouth daily    Generalized anxiety disorder    Orders:    FLUoxetine (PROzac) 10 mg capsule; Take 1 capsule (10 mg total) by mouth daily    Autism spectrum disorder             Additonal Recommendations/Precautions:    Continue psychotherapy  Aware of need to follow up with family physician for medical issues  Aware of 24 hour and weekend coverage for urgent situations accessed by calling [facility] main practice number    Medications Risks/Benefits      Risks, Benefits And Possible Side Effects Of Medications:    Risks, benefits, and possible side effects of medications explained to Jennifer including risk of suicidality and serotonin syndrome related to treatment with antidepressants and risks of cardiovascular side effects including elevated blood pressure, risk of misuse, abuse or dependence and risk of increased anxiety related to treatment with stimulant medications. He " verbalizes understanding and agreement for treatment..    Controlled Medication Discussion:     [place]athy has been filling controlled prescriptions on time as prescribed according to [place] Prescription Drug Monitoring Program  Discussed with Jennifer the risks of sedation, respiratory depression, impairment of ability to drive and potential for abuse and addiction related to treatment with benzodiazepine medications. He understands risk of treatment with benzodiazepine medications, agrees to not drive if feels impaired and agrees to take medications as prescribed  Discussed with Jennifer Black Box warning on concurrent use of benzodiazepines and opioid medications including sedation, respiratory depression, coma and death. He understands the risk of treatment with benzodiazepines in addition to opioids and wants to continue taking those medications    Psychotherapy Provided:     Individual psychotherapy provided: Yes  Counseling was provided during the session today for 17 minutes.  Recent stressor including health issues, medical problems, recent medication change, social difficulties, everyday stressors, and occasional anxiety discussed with Jennifer.   Coping skills reviewed with Jennifer.   Reassurance and supportive therapy provided.      Treatment Plan:    Completed and signed during the session: Not applicable - Treatment Plan not due at this session    This note was not shared with the patient due to reasonable likelihood of causing patient harm    Visit Time    Visit Start Time: 135pm  Visit Stop Time: 158pm  Total Visit Duration:  23 minutes        Gomez Muñoz MD 12/30/24

## 2025-01-12 ENCOUNTER — TELEPHONE (OUTPATIENT)
Dept: OTHER | Facility: OTHER | Age: 35
End: 2025-01-12

## 2025-01-12 NOTE — TELEPHONE ENCOUNTER
Patient is calling regarding cancelling an appointment.    Date/Time: 1/13/2025 8:00    Patient was rescheduled: YES [] NO [x]    Patient requesting call back to reschedule: YES [x] NO []

## 2025-01-13 ENCOUNTER — TELEPHONE (OUTPATIENT)
Dept: PSYCHIATRY | Facility: CLINIC | Age: 35
End: 2025-01-13

## 2025-01-13 NOTE — TELEPHONE ENCOUNTER
Writer contacted patient to request update insurance as current insurance on file is coming back inactive.

## 2025-02-06 DIAGNOSIS — F90.2 ADHD (ATTENTION DEFICIT HYPERACTIVITY DISORDER), COMBINED TYPE: ICD-10-CM

## 2025-02-06 NOTE — TELEPHONE ENCOUNTER
Reason for call:   [x] Refill   [] Prior Auth  [] Other:     Office:   [] PCP/Provider -   [x] Specialty/Provider - [facility] Josselyn Roche MD     Medication: lisdexamfetamine (Vyvanse) 30 MG capsule     Dose/Frequency: Take 1 capsule (30 mg total) by mouth every morning Max Daily Amount: 30 mg     Quantity: 30 capsule     Pharmacy: [facility]/pharmacy #[id number] - [place], [place] - [address] [phone]    Does the patient have enough for 3 days?   [x] Yes   [] No - Send as HP to POD

## 2025-02-07 RX ORDER — LISDEXAMFETAMINE DIMESYLATE 30 MG/1
30 CAPSULE ORAL EVERY MORNING
Qty: 30 CAPSULE | Refills: 0 | Status: SHIPPED | OUTPATIENT
Start: 2025-02-07

## 2025-02-17 ENCOUNTER — TELEPHONE (OUTPATIENT)
Dept: PSYCHIATRY | Facility: CLINIC | Age: 35
End: 2025-02-17

## 2025-02-17 NOTE — TELEPHONE ENCOUNTER
Writer called and left message to reschedule appointment for today due to Thelma being out of office sick.

## 2025-02-20 ENCOUNTER — TELEPHONE (OUTPATIENT)
Dept: BEHAVIORAL/MENTAL HEALTH CLINIC | Facility: CLINIC | Age: 35
End: 2025-02-20

## 2025-02-28 DIAGNOSIS — F32.A DEPRESSION, UNSPECIFIED DEPRESSION TYPE: ICD-10-CM

## 2025-02-28 DIAGNOSIS — F41.1 GENERALIZED ANXIETY DISORDER: ICD-10-CM

## 2025-02-28 RX ORDER — FLUOXETINE 10 MG/1
10 CAPSULE ORAL DAILY
Qty: 30 CAPSULE | Refills: 2 | Status: SHIPPED | OUTPATIENT
Start: 2025-02-28 | End: 2025-02-28 | Stop reason: SDUPTHER

## 2025-02-28 RX ORDER — FLUOXETINE 10 MG/1
10 CAPSULE ORAL DAILY
Qty: 90 CAPSULE | Refills: 0 | Status: SHIPPED | OUTPATIENT
Start: 2025-02-28 | End: 2025-05-29

## 2025-03-26 ENCOUNTER — DOCUMENTATION (OUTPATIENT)
Dept: BEHAVIORAL/MENTAL HEALTH CLINIC | Facility: CLINIC | Age: 35
End: 2025-03-26

## 2025-03-26 DIAGNOSIS — F90.2 ATTENTION DEFICIT HYPERACTIVITY DISORDER, COMBINED TYPE: Primary | ICD-10-CM

## 2025-04-04 ENCOUNTER — TELEPHONE (OUTPATIENT)
Age: 35
End: 2025-04-04

## 2025-04-04 ENCOUNTER — TELEMEDICINE (OUTPATIENT)
Dept: PSYCHIATRY | Facility: CLINIC | Age: 35
End: 2025-04-04

## 2025-04-04 DIAGNOSIS — F90.2 ADHD (ATTENTION DEFICIT HYPERACTIVITY DISORDER), COMBINED TYPE: ICD-10-CM

## 2025-04-04 DIAGNOSIS — F32.A DEPRESSION, UNSPECIFIED DEPRESSION TYPE: Primary | ICD-10-CM

## 2025-04-04 DIAGNOSIS — F41.1 GENERALIZED ANXIETY DISORDER: ICD-10-CM

## 2025-04-04 PROCEDURE — NOSHOW: Performed by: STUDENT IN AN ORGANIZED HEALTH CARE EDUCATION/TRAINING PROGRAM

## 2025-04-04 RX ORDER — LISDEXAMFETAMINE DIMESYLATE 30 MG/1
30 CAPSULE ORAL EVERY MORNING
Qty: 30 CAPSULE | Refills: 0 | Status: SHIPPED | OUTPATIENT
Start: 2025-04-04

## 2025-06-05 DIAGNOSIS — F41.1 GENERALIZED ANXIETY DISORDER: ICD-10-CM

## 2025-06-05 DIAGNOSIS — F32.A DEPRESSION, UNSPECIFIED DEPRESSION TYPE: ICD-10-CM

## 2025-06-05 RX ORDER — FLUOXETINE 10 MG/1
10 CAPSULE ORAL DAILY
Qty: 90 CAPSULE | Refills: 0 | Status: SHIPPED | OUTPATIENT
Start: 2025-06-05